# Patient Record
Sex: FEMALE | Race: WHITE | NOT HISPANIC OR LATINO
[De-identification: names, ages, dates, MRNs, and addresses within clinical notes are randomized per-mention and may not be internally consistent; named-entity substitution may affect disease eponyms.]

---

## 2018-06-26 ENCOUNTER — APPOINTMENT (OUTPATIENT)
Dept: NEUROLOGY | Facility: CLINIC | Age: 48
End: 2018-06-26
Payer: COMMERCIAL

## 2018-06-26 VITALS
RESPIRATION RATE: 18 BRPM | HEIGHT: 62 IN | WEIGHT: 110 LBS | DIASTOLIC BLOOD PRESSURE: 70 MMHG | BODY MASS INDEX: 20.24 KG/M2 | HEART RATE: 84 BPM | TEMPERATURE: 98.4 F | OXYGEN SATURATION: 96 % | SYSTOLIC BLOOD PRESSURE: 110 MMHG

## 2018-06-26 DIAGNOSIS — Z80.41 FAMILY HISTORY OF MALIGNANT NEOPLASM OF OVARY: ICD-10-CM

## 2018-06-26 DIAGNOSIS — E03.2 HYPOTHYROIDISM DUE TO MEDICAMENTS AND OTHER EXOGENOUS SUBSTANCES: ICD-10-CM

## 2018-06-26 DIAGNOSIS — Z82.0 FAMILY HISTORY OF EPILEPSY AND OTHER DISEASES OF THE NERVOUS SYSTEM: ICD-10-CM

## 2018-06-26 DIAGNOSIS — Z82.49 FAMILY HISTORY OF ISCHEMIC HEART DISEASE AND OTHER DISEASES OF THE CIRCULATORY SYSTEM: ICD-10-CM

## 2018-06-26 DIAGNOSIS — Z83.49 FAMILY HISTORY OF OTHER ENDOCRINE, NUTRITIONAL AND METABOLIC DISEASES: ICD-10-CM

## 2018-06-26 DIAGNOSIS — G43.709 CHRONIC MIGRAINE W/OUT AURA, NOT INTRACTABLE, W/OUT STATUS MIGRAINOSUS: ICD-10-CM

## 2018-06-26 PROCEDURE — 99244 OFF/OP CNSLTJ NEW/EST MOD 40: CPT

## 2018-08-10 ENCOUNTER — APPOINTMENT (OUTPATIENT)
Dept: NEUROLOGY | Facility: CLINIC | Age: 48
End: 2018-08-10

## 2021-08-26 ENCOUNTER — APPOINTMENT (OUTPATIENT)
Dept: ENDOCRINOLOGY | Facility: CLINIC | Age: 51
End: 2021-08-26
Payer: COMMERCIAL

## 2021-08-26 VITALS
HEIGHT: 62 IN | OXYGEN SATURATION: 99 % | DIASTOLIC BLOOD PRESSURE: 81 MMHG | WEIGHT: 135 LBS | SYSTOLIC BLOOD PRESSURE: 115 MMHG | HEART RATE: 80 BPM | BODY MASS INDEX: 24.84 KG/M2

## 2021-08-26 PROCEDURE — 99205 OFFICE O/P NEW HI 60 MIN: CPT

## 2021-08-28 NOTE — HISTORY OF PRESENT ILLNESS
[FreeTextEntry1] : Ms. MAKAYLA ROSS is a 51 year old female  with a past medical history of hypothyroidism who presents for management of her thyroid disease. Patient was first diagnosed with thyroid disorder 24 years ago. It started after her first pregnancy. Patient was on Levothyroxine 88 mcg QD for 17 years. Since May of last year, she started having edema.  She  takes levothyroxine/methimazole   mg  QD. Patient takes it in an empty stomach 30-60 mins before breakfast in the morning. \par Patient was started on Bioidentical hormones on November 2020. Patient kept gaining weight and she stopped the bioidentical hormones in 2/2021. She went to another endo. She was switched to Unithroid 100 mcg QD (May/2021). She did not feel better. She was switched to Unithroid 75 mcg + Cytomel 5 mcg. She was increased to Unithroid 100 mcg. She was also started on Bijuva. Patient had a breakthrough period in March. Before October, she had almost every month. Her period had stopped at one point on July 2019 when she had a major loss in her life.  \par \par \par

## 2021-08-28 NOTE — PHYSICAL EXAM
[Alert] : alert [Well Nourished] : well nourished [No Acute Distress] : no acute distress [Normal Sclera/Conjunctiva] : normal sclera/conjunctiva [Well Developed] : well developed [EOMI] : extra ocular movement intact [No Proptosis] : no proptosis [Normal Oropharynx] : the oropharynx was normal [No Thyroid Nodules] : no palpable thyroid nodules [Thyroid Not Enlarged] : the thyroid was not enlarged [No Respiratory Distress] : no respiratory distress [No Accessory Muscle Use] : no accessory muscle use [Clear to Auscultation] : lungs were clear to auscultation bilaterally [Normal S1, S2] : normal S1 and S2 [Normal Rate] : heart rate was normal [Regular Rhythm] : with a regular rhythm [No Edema] : no peripheral edema [Pedal Pulses Normal] : the pedal pulses are present [Not Tender] : non-tender [Normal Bowel Sounds] : normal bowel sounds [Not Distended] : not distended [Soft] : abdomen soft [Normal Anterior Cervical Nodes] : no anterior cervical lymphadenopathy [Normal Posterior Cervical Nodes] : no posterior cervical lymphadenopathy [No Spinal Tenderness] : no spinal tenderness [Spine Straight] : spine straight [No Stigmata of Cushings Syndrome] : no stigmata of Cushings Syndrome [Normal Gait] : normal gait [Normal Strength/Tone] : muscle strength and tone were normal [No Rash] : no rash [Acanthosis Nigricans] : no acanthosis nigricans [Normal Reflexes] : deep tendon reflexes were 2+ and symmetric [No Tremors] : no tremors [Oriented x3] : oriented to person, place, and time

## 2021-08-28 NOTE — ASSESSMENT
[FreeTextEntry1] : 51 year old female  with a past medical history of hypothyroidism who presents for management of her thyroid disease\par \par 1. Weight gain\par Suspect patient is experiencing all her symptoms from menopause\par Her labs are consistent with menopause\par It is likely the reason why her change in dose of thyroid replacement has not improved her symptoms\par Recommend to cont Bijuva for bone protection but explained it will not resolve all her symptoms or help with weight loss\par Patient is overwhelmed and upset by her weight gain. She is tearful. She does not feel well but unfortunately she is likey going through natural menopausal changes\par Discussed a course of phentermine to help reduce her BMI back to previous level\par Patient is in agreement\par \par 2. Hypothyroidism\par Her TSH is too low\par Stop Cytomel\par Cont Unithroid 100 mcg QD\par \par Follow up in 2 months

## 2021-09-23 ENCOUNTER — NON-APPOINTMENT (OUTPATIENT)
Age: 51
End: 2021-09-23

## 2021-11-01 ENCOUNTER — APPOINTMENT (OUTPATIENT)
Dept: ENDOCRINOLOGY | Facility: CLINIC | Age: 51
End: 2021-11-01
Payer: COMMERCIAL

## 2021-11-01 VITALS — OXYGEN SATURATION: 99 % | DIASTOLIC BLOOD PRESSURE: 80 MMHG | SYSTOLIC BLOOD PRESSURE: 134 MMHG | HEART RATE: 79 BPM

## 2021-11-01 PROCEDURE — 99214 OFFICE O/P EST MOD 30 MIN: CPT

## 2021-11-01 NOTE — PHYSICAL EXAM
[Alert] : alert [Well Nourished] : well nourished [Healthy Appearance] : healthy appearance [No Acute Distress] : no acute distress [Well Developed] : well developed [Normal Voice/Communication] : normal voice communication [Normal Sclera/Conjunctiva] : normal sclera/conjunctiva

## 2021-11-02 NOTE — HISTORY OF PRESENT ILLNESS
[FreeTextEntry1] : Ms. MAKAYLA ROSS is a 51 year old female  with a past medical history of hypothyroidism who presents for management of her thyroid disease. Patient feels a bit better. She lost 5 lbs.She is following with menopause specialist.\par \par Initial Hx: Patient was first diagnosed with thyroid disorder 24 years ago. It started after her first pregnancy. Patient was on Levothyroxine 88 mcg QD for 17 years. Since May of last year, she started having edema.  She  takes levothyroxine/methimazole   mg  QD. Patient takes it in an empty stomach 30-60 mins before breakfast in the morning. \par Patient was started on Bioidentical hormones on November 2020. Patient kept gaining weight and she stopped the bioidentical hormones in 2/2021. She went to another endo. She was switched to Unithroid 100 mcg QD (May/2021). She did not feel better. She was switched to Unithroid 75 mcg + Cytomel 5 mcg. She was increased to Unithroid 100 mcg. She was also started on Bijuva. Patient had a breakthrough period in March. Before October, she had almost every month. Her period had stopped at one point on July 2019 when she had a major loss in her life.  \par \par \par

## 2021-12-08 ENCOUNTER — NON-APPOINTMENT (OUTPATIENT)
Age: 51
End: 2021-12-08

## 2022-02-01 ENCOUNTER — APPOINTMENT (OUTPATIENT)
Dept: ENDOCRINOLOGY | Facility: CLINIC | Age: 52
End: 2022-02-01
Payer: COMMERCIAL

## 2022-02-01 VITALS
WEIGHT: 135 LBS | HEIGHT: 62 IN | SYSTOLIC BLOOD PRESSURE: 125 MMHG | OXYGEN SATURATION: 99 % | DIASTOLIC BLOOD PRESSURE: 85 MMHG | BODY MASS INDEX: 24.84 KG/M2 | HEART RATE: 80 BPM

## 2022-02-01 PROCEDURE — 99214 OFFICE O/P EST MOD 30 MIN: CPT

## 2022-02-01 RX ORDER — PHENTERMINE HYDROCHLORIDE 30 MG/1
30 CAPSULE ORAL
Qty: 30 | Refills: 2 | Status: DISCONTINUED | COMMUNITY
Start: 2021-08-26 | End: 2022-02-01

## 2022-02-02 NOTE — ASSESSMENT
[FreeTextEntry1] : 51 year old female  with a past medical history of hypothyroidism who presents for management of her thyroid disease\par \par 1. Weight gain\par Suspect patient is experiencing all her symptoms from menopause\par Her labs are consistent with menopause\par Patient stopped Phentermine\par She has been able to lose 8 lbs \par She is asking for another agent to helo with weight loss\par I suggested Bashir \par I also encouraged that overall her weight is healthy and she follows a very healthy lifestyle\par \par 2. Hypothyroidism\par TFTs are appropriate range\par Cont Levothyroxine to 100 mcg 6.5 tabs per week\par \par Follow up in 6 months

## 2022-02-02 NOTE — HISTORY OF PRESENT ILLNESS
[FreeTextEntry1] : Ms. MAKAYLA ROSS is a 51 year old female  with a past medical history of hypothyroidism who presents for management of her thyroid disease. Patient feels okay. She is still very upset about her weight. She was prescribed HRT by her menopause specialist. She had recent labs done.\par \par Initial Hx: Patient was first diagnosed with thyroid disorder 24 years ago. It started after her first pregnancy. Patient was on Levothyroxine 88 mcg QD for 17 years. Since May of last year, she started having edema.  She  takes levothyroxine/methimazole   mg  QD. Patient takes it in an empty stomach 30-60 mins before breakfast in the morning. \par Patient was started on Bioidentical hormones on November 2020. Patient kept gaining weight and she stopped the bioidentical hormones in 2/2021. She went to another endo. She was switched to Unithroid 100 mcg QD (May/2021). She did not feel better. She was switched to Unithroid 75 mcg + Cytomel 5 mcg. She was increased to Unithroid 100 mcg. She was also started on Bijuva. Patient had a breakthrough period in March. Before October, she had almost every month. Her period had stopped at one point on July 2019 when she had a major loss in her life.  \par \par \par

## 2022-02-02 NOTE — PHYSICAL EXAM
[Alert] : alert [Well Nourished] : well nourished [Healthy Appearance] : healthy appearance [No Acute Distress] : no acute distress [Well Developed] : well developed [Normal Voice/Communication] : normal voice communication [No Rash] : no rash [Oriented x3] : oriented to person, place, and time

## 2022-06-28 ENCOUNTER — RX RENEWAL (OUTPATIENT)
Age: 52
End: 2022-06-28

## 2022-08-23 ENCOUNTER — APPOINTMENT (OUTPATIENT)
Dept: ENDOCRINOLOGY | Facility: CLINIC | Age: 52
End: 2022-08-23

## 2022-08-23 ENCOUNTER — TRANSCRIPTION ENCOUNTER (OUTPATIENT)
Age: 52
End: 2022-08-23

## 2022-08-23 VITALS
DIASTOLIC BLOOD PRESSURE: 88 MMHG | OXYGEN SATURATION: 97 % | WEIGHT: 129 LBS | HEART RATE: 71 BPM | HEIGHT: 62 IN | SYSTOLIC BLOOD PRESSURE: 130 MMHG | BODY MASS INDEX: 23.74 KG/M2

## 2022-08-23 DIAGNOSIS — R63.5 ABNORMAL WEIGHT GAIN: ICD-10-CM

## 2022-08-23 PROCEDURE — 99214 OFFICE O/P EST MOD 30 MIN: CPT

## 2022-08-23 NOTE — HISTORY OF PRESENT ILLNESS
[FreeTextEntry1] : Ms. MAKAYLA ROSS is a 52 year old female  with a past medical history of hypothyroidism who presents for management of her thyroid disease. Patient feels okay. She is still very upset about her weight. She is on HRT by menopausal specialist. \par \par Initial Hx: Patient was first diagnosed with thyroid disorder 24 years ago. It started after her first pregnancy. Patient was on Levothyroxine 88 mcg QD for 17 years. Since May of last year, she started having edema.  She  takes levothyroxine/methimazole   mg  QD. Patient takes it in an empty stomach 30-60 mins before breakfast in the morning. \par Patient was started on Bioidentical hormones on November 2020. Patient kept gaining weight and she stopped the bioidentical hormones in 2/2021. She went to another endo. She was switched to Unithroid 100 mcg QD (May/2021). She did not feel better. She was switched to Unithroid 75 mcg + Cytomel 5 mcg. She was increased to Unithroid 100 mcg. She was also started on Bijuva. Patient had a breakthrough period in March. Before October, she had almost every month. Her period had stopped at one point on July 2019 when she had a major loss in her life.  \par \par \par

## 2022-08-23 NOTE — ASSESSMENT
[FreeTextEntry1] : 52 year old female  with a past medical history of hypothyroidism who presents for management of her thyroid disease\par \par 1. Weight gain\par Suspect patient is experiencing all her symptoms from menopause\par Her labs are consistent with menopause\par HRT also likely contributed but it has more benefits for her\par Will try Buproprion to help with weight loss\par I also encouraged that overall her weight is healthy and she follows a very healthy lifestyle\par \par 2. Hypothyroidism\par TFTs are appropriate range\par Cont Levothyroxine to 100 mcg 6.5 tabs per week\par \par Follow up in 6 months

## 2022-09-30 RX ORDER — ORLISTAT 60 MG/1
60 CAPSULE ORAL
Qty: 90 | Refills: 1 | Status: DISCONTINUED | COMMUNITY
Start: 2022-02-01 | End: 2022-09-30

## 2022-10-20 ENCOUNTER — RX RENEWAL (OUTPATIENT)
Age: 52
End: 2022-10-20

## 2023-01-06 ENCOUNTER — RX RENEWAL (OUTPATIENT)
Age: 53
End: 2023-01-06

## 2023-02-01 ENCOUNTER — RX RENEWAL (OUTPATIENT)
Age: 53
End: 2023-02-01

## 2023-03-01 ENCOUNTER — APPOINTMENT (OUTPATIENT)
Dept: ENDOCRINOLOGY | Facility: CLINIC | Age: 53
End: 2023-03-01

## 2023-03-02 ENCOUNTER — APPOINTMENT (OUTPATIENT)
Dept: GYNECOLOGIC ONCOLOGY | Facility: CLINIC | Age: 53
End: 2023-03-02
Payer: COMMERCIAL

## 2023-03-02 VITALS
OXYGEN SATURATION: 100 % | WEIGHT: 125 LBS | SYSTOLIC BLOOD PRESSURE: 122 MMHG | BODY MASS INDEX: 23 KG/M2 | DIASTOLIC BLOOD PRESSURE: 82 MMHG | HEIGHT: 62 IN | HEART RATE: 69 BPM

## 2023-03-02 DIAGNOSIS — Z01.818 ENCOUNTER FOR OTHER PREPROCEDURAL EXAMINATION: ICD-10-CM

## 2023-03-02 DIAGNOSIS — Z87.891 PERSONAL HISTORY OF NICOTINE DEPENDENCE: ICD-10-CM

## 2023-03-02 PROCEDURE — 99204 OFFICE O/P NEW MOD 45 MIN: CPT

## 2023-03-02 RX ORDER — TOPIRAMATE 200 MG/1
200 CAPSULE, EXTENDED RELEASE ORAL
Refills: 0 | Status: DISCONTINUED | COMMUNITY
End: 2023-03-02

## 2023-03-02 RX ORDER — TOPIRAMATE 50 MG/1
TABLET, COATED ORAL
Refills: 0 | Status: ACTIVE | COMMUNITY

## 2023-03-02 RX ORDER — PROPRANOLOL HYDROCHLORIDE 80 MG/1
80 CAPSULE, EXTENDED RELEASE ORAL
Qty: 60 | Refills: 0 | Status: DISCONTINUED | COMMUNITY
Start: 2018-06-26 | End: 2023-03-02

## 2023-03-02 RX ORDER — LEVOTHYROXINE SODIUM 0.09 MG/1
88 TABLET ORAL
Refills: 0 | Status: DISCONTINUED | COMMUNITY
End: 2023-03-02

## 2023-03-06 ENCOUNTER — FORM ENCOUNTER (OUTPATIENT)
Age: 53
End: 2023-03-06

## 2023-03-06 ENCOUNTER — RESULT REVIEW (OUTPATIENT)
Age: 53
End: 2023-03-06

## 2023-03-06 NOTE — HISTORY OF PRESENT ILLNESS
[FreeTextEntry1] : 53 yo  via  x 3 LMP 3/2021 referred by Dr. Manjit Major, presents to office to discuss newly diagnosed endometrial cancer. Patient reports since 10/2020 she has been experiencing multiple abnormal symptoms including bilateral leg swelling and unexplained weight gain which she was initially related to her thyroid (hx of Hashimoto's) for which she presented to her GYN, and was told she had no circulating hormones by PA. She then reports over the time frame of 1.5 years she was on oral estrogen, progesterone, cortisol, T3 as well as bioidentical hormones in pill and cream form. She reports to no change in her symptoms at this time and presented to Dr. Major in 3/2022 and was prescribed vaginal progesterone and had menstrual cycle x 1. She then followed with Dr. Radha Gutierrez who specializes in menopausal symptoms and was prescribed estradiol patch x 4 months then began to take oral bioidentical progesterone in 2022. She reports episode of heavy vaginal bleeding in 2022 lasting 7 days with associated pelvic cramping and ovarian discomfort which prompted workup. Denies abdominal pain/bloating/distension, pelvis discomfort, changes in normal bowel/urinary habits, nausea/vomiting, unintentional weight loss/gain, and all other associated signs and symptoms. \par \par Patient underwent EMB on 23 with pathology revealing small fragments of adenocarcinoma, endometrioid type, well differentiated, FIGO 1/3.\par \par Of note patient reports to having genetic testing done > 10 years ago which was negative. \par \par Last pap: ; normal as per patient \par Last mammo: ; normal as per patient \par Last colo: Patient has not yet had\par \par Slide release -- Jim labs [id number]?

## 2023-03-06 NOTE — END OF VISIT
[FreeTextEntry3] : This note accurately reflects the work and decisions made by me.\par Written by Annita Lazaro PA-C acting as a scribe for Dr. Caty Mark.

## 2023-03-06 NOTE — PHYSICAL EXAM
[Chaperone Present] : A chaperone was present in the examining room during all aspects of the physical examination [Normal] : Bimanual Exam: Normal [FreeTextEntry1] : Annita Lazaro PA-C [de-identified] : retroverted  [de-identified] : Patient refused rectal examination at this time.

## 2023-03-06 NOTE — CHIEF COMPLAINT
[FreeTextEntry1] : [facility]\par [address]\[place] [address]\par [phone]\par \par Endometrial cancer

## 2023-03-06 NOTE — ASSESSMENT
[FreeTextEntry1] : 53 yo female with newly diagnosed endometrial cancer. I discussed with the patient the recommendation due to prolonged symptoms and post menopausal bleeding/spotting I would recommend CT C/A/P for further evaluation. We will also request genetic testing and records from Dr. Major for review. \par \par I discussed at length with the patient the nature, purpose, risks, benefits, and alternatives of pelvic examination under anesthesia, robotic assisted total laparoscopic hysterectomy with bilateral salpingo-oophorectomy, sentinel lymph node mapping with node biopsies, possible cystoscopy, possible laparotomy.  The patient understands the risks to include (but not be limited to) bowel injury, bleeding (with the possible need for transfusion), bladder or ureteral injury, infections, deep venous thrombosis, and beatriz-operative death.  The patient also understands that her surgery may not be able to be performed laparoscopically and that she may need a laparotomy (either vertical midline or pfannensteil).  She also understands the limitations of laparoscopic surgery and the possibility of missing a surgical complication with need for subsequent re-exploration.  She agrees to proceed.  She asked numerous questions which were answered to her satisfaction. She also understands the rationale for a possible cystoscopy at the completion of the procedure and the potential risks of cystoscopy.\par \par All questions and concerns were answered to patient's apparent satisfaction.\par

## 2023-03-08 ENCOUNTER — OUTPATIENT (OUTPATIENT)
Dept: OUTPATIENT SERVICES | Facility: HOSPITAL | Age: 53
LOS: 1 days | End: 2023-03-08
Payer: COMMERCIAL

## 2023-03-08 ENCOUNTER — APPOINTMENT (OUTPATIENT)
Dept: CT IMAGING | Facility: CLINIC | Age: 53
End: 2023-03-08
Payer: COMMERCIAL

## 2023-03-08 DIAGNOSIS — Z41.9 ENCOUNTER FOR PROCEDURE FOR PURPOSES OTHER THAN REMEDYING HEALTH STATE, UNSPECIFIED: Chronic | ICD-10-CM

## 2023-03-08 DIAGNOSIS — C54.1 MALIGNANT NEOPLASM OF ENDOMETRIUM: ICD-10-CM

## 2023-03-08 DIAGNOSIS — Z98.89 OTHER SPECIFIED POSTPROCEDURAL STATES: Chronic | ICD-10-CM

## 2023-03-08 PROCEDURE — 74177 CT ABD & PELVIS W/CONTRAST: CPT | Mod: 26

## 2023-03-08 PROCEDURE — 71260 CT THORAX DX C+: CPT

## 2023-03-08 PROCEDURE — 71260 CT THORAX DX C+: CPT | Mod: 26

## 2023-03-08 PROCEDURE — 74177 CT ABD & PELVIS W/CONTRAST: CPT

## 2023-03-10 ENCOUNTER — TRANSCRIPTION ENCOUNTER (OUTPATIENT)
Age: 53
End: 2023-03-10

## 2023-03-13 ENCOUNTER — TRANSCRIPTION ENCOUNTER (OUTPATIENT)
Age: 53
End: 2023-03-13

## 2023-03-13 ENCOUNTER — RESULT REVIEW (OUTPATIENT)
Age: 53
End: 2023-03-13

## 2023-03-13 ENCOUNTER — OUTPATIENT (OUTPATIENT)
Dept: OUTPATIENT SERVICES | Facility: HOSPITAL | Age: 53
LOS: 1 days | End: 2023-03-13
Payer: MEDICARE

## 2023-03-13 DIAGNOSIS — N85: ICD-10-CM

## 2023-03-13 DIAGNOSIS — Z98.89 OTHER SPECIFIED POSTPROCEDURAL STATES: Chronic | ICD-10-CM

## 2023-03-13 DIAGNOSIS — Z41.9 ENCOUNTER FOR PROCEDURE FOR PURPOSES OTHER THAN REMEDYING HEALTH STATE, UNSPECIFIED: Chronic | ICD-10-CM

## 2023-03-13 LAB — SURGICAL PATHOLOGY STUDY: SIGNIFICANT CHANGE UP

## 2023-03-13 PROCEDURE — 88321 CONSLTJ&REPRT SLD PREP ELSWR: CPT

## 2023-03-14 ENCOUNTER — TRANSCRIPTION ENCOUNTER (OUTPATIENT)
Age: 53
End: 2023-03-14

## 2023-03-14 ENCOUNTER — NON-APPOINTMENT (OUTPATIENT)
Age: 53
End: 2023-03-14

## 2023-03-15 ENCOUNTER — TRANSCRIPTION ENCOUNTER (OUTPATIENT)
Age: 53
End: 2023-03-15

## 2023-03-16 ENCOUNTER — FORM ENCOUNTER (OUTPATIENT)
Age: 53
End: 2023-03-16

## 2023-03-20 ENCOUNTER — NON-APPOINTMENT (OUTPATIENT)
Age: 53
End: 2023-03-20

## 2023-03-20 ENCOUNTER — TRANSCRIPTION ENCOUNTER (OUTPATIENT)
Age: 53
End: 2023-03-20

## 2023-03-20 ENCOUNTER — OUTPATIENT (OUTPATIENT)
Dept: OUTPATIENT SERVICES | Facility: HOSPITAL | Age: 53
LOS: 1 days | End: 2023-03-20
Payer: MEDICARE

## 2023-03-20 VITALS
HEIGHT: 62 IN | WEIGHT: 121.25 LBS | HEART RATE: 82 BPM | OXYGEN SATURATION: 98 % | DIASTOLIC BLOOD PRESSURE: 70 MMHG | TEMPERATURE: 98 F | RESPIRATION RATE: 12 BRPM | SYSTOLIC BLOOD PRESSURE: 118 MMHG

## 2023-03-20 DIAGNOSIS — Z98.890 OTHER SPECIFIED POSTPROCEDURAL STATES: Chronic | ICD-10-CM

## 2023-03-20 DIAGNOSIS — C54.1 MALIGNANT NEOPLASM OF ENDOMETRIUM: ICD-10-CM

## 2023-03-20 DIAGNOSIS — E03.9 HYPOTHYROIDISM, UNSPECIFIED: ICD-10-CM

## 2023-03-20 DIAGNOSIS — Z41.9 ENCOUNTER FOR PROCEDURE FOR PURPOSES OTHER THAN REMEDYING HEALTH STATE, UNSPECIFIED: Chronic | ICD-10-CM

## 2023-03-20 DIAGNOSIS — Z13.89 ENCOUNTER FOR SCREENING FOR OTHER DISORDER: ICD-10-CM

## 2023-03-20 DIAGNOSIS — Z98.89 OTHER SPECIFIED POSTPROCEDURAL STATES: Chronic | ICD-10-CM

## 2023-03-20 DIAGNOSIS — Z29.9 ENCOUNTER FOR PROPHYLACTIC MEASURES, UNSPECIFIED: ICD-10-CM

## 2023-03-20 DIAGNOSIS — Z01.818 ENCOUNTER FOR OTHER PREPROCEDURAL EXAMINATION: ICD-10-CM

## 2023-03-20 LAB
A1C WITH ESTIMATED AVERAGE GLUCOSE RESULT: 5.5 % — SIGNIFICANT CHANGE UP (ref 4–5.6)
ANION GAP SERPL CALC-SCNC: 11 MMOL/L — SIGNIFICANT CHANGE UP (ref 5–17)
APTT BLD: 29.2 SEC — SIGNIFICANT CHANGE UP (ref 27.5–35.5)
BASOPHILS # BLD AUTO: 0.04 K/UL — SIGNIFICANT CHANGE UP (ref 0–0.2)
BASOPHILS NFR BLD AUTO: 0.7 % — SIGNIFICANT CHANGE UP (ref 0–2)
BLD GP AB SCN SERPL QL: SIGNIFICANT CHANGE UP
BUN SERPL-MCNC: 11.2 MG/DL — SIGNIFICANT CHANGE UP (ref 8–20)
CALCIUM SERPL-MCNC: 9.3 MG/DL — SIGNIFICANT CHANGE UP (ref 8.4–10.5)
CHLORIDE SERPL-SCNC: 110 MMOL/L — HIGH (ref 96–108)
CO2 SERPL-SCNC: 23 MMOL/L — SIGNIFICANT CHANGE UP (ref 22–29)
CREAT SERPL-MCNC: 0.95 MG/DL — SIGNIFICANT CHANGE UP (ref 0.5–1.3)
EGFR: 72 ML/MIN/1.73M2 — SIGNIFICANT CHANGE UP
EOSINOPHIL # BLD AUTO: 0.14 K/UL — SIGNIFICANT CHANGE UP (ref 0–0.5)
EOSINOPHIL NFR BLD AUTO: 2.4 % — SIGNIFICANT CHANGE UP (ref 0–6)
ESTIMATED AVERAGE GLUCOSE: 111 MG/DL — SIGNIFICANT CHANGE UP (ref 68–114)
GLUCOSE SERPL-MCNC: 102 MG/DL — HIGH (ref 70–99)
HCT VFR BLD CALC: 41.9 % — SIGNIFICANT CHANGE UP (ref 34.5–45)
HGB BLD-MCNC: 13.8 G/DL — SIGNIFICANT CHANGE UP (ref 11.5–15.5)
IMM GRANULOCYTES NFR BLD AUTO: 0.2 % — SIGNIFICANT CHANGE UP (ref 0–0.9)
INR BLD: 1.09 RATIO — SIGNIFICANT CHANGE UP (ref 0.88–1.16)
LYMPHOCYTES # BLD AUTO: 2 K/UL — SIGNIFICANT CHANGE UP (ref 1–3.3)
LYMPHOCYTES # BLD AUTO: 33.8 % — SIGNIFICANT CHANGE UP (ref 13–44)
MAGNESIUM SERPL-MCNC: 1.9 MG/DL — SIGNIFICANT CHANGE UP (ref 1.6–2.6)
MCHC RBC-ENTMCNC: 29.7 PG — SIGNIFICANT CHANGE UP (ref 27–34)
MCHC RBC-ENTMCNC: 32.9 GM/DL — SIGNIFICANT CHANGE UP (ref 32–36)
MCV RBC AUTO: 90.1 FL — SIGNIFICANT CHANGE UP (ref 80–100)
MONOCYTES # BLD AUTO: 0.49 K/UL — SIGNIFICANT CHANGE UP (ref 0–0.9)
MONOCYTES NFR BLD AUTO: 8.3 % — SIGNIFICANT CHANGE UP (ref 2–14)
NEUTROPHILS # BLD AUTO: 3.23 K/UL — SIGNIFICANT CHANGE UP (ref 1.8–7.4)
NEUTROPHILS NFR BLD AUTO: 54.6 % — SIGNIFICANT CHANGE UP (ref 43–77)
PHOSPHATE SERPL-MCNC: 2.9 MG/DL — SIGNIFICANT CHANGE UP (ref 2.4–4.7)
PLATELET # BLD AUTO: 324 K/UL — SIGNIFICANT CHANGE UP (ref 150–400)
POTASSIUM SERPL-MCNC: 4 MMOL/L — SIGNIFICANT CHANGE UP (ref 3.5–5.3)
POTASSIUM SERPL-SCNC: 4 MMOL/L — SIGNIFICANT CHANGE UP (ref 3.5–5.3)
PROTHROM AB SERPL-ACNC: 12.7 SEC — SIGNIFICANT CHANGE UP (ref 10.5–13.4)
RBC # BLD: 4.65 M/UL — SIGNIFICANT CHANGE UP (ref 3.8–5.2)
RBC # FLD: 12.7 % — SIGNIFICANT CHANGE UP (ref 10.3–14.5)
SODIUM SERPL-SCNC: 144 MMOL/L — SIGNIFICANT CHANGE UP (ref 135–145)
T3 SERPL-MCNC: 93 NG/DL — SIGNIFICANT CHANGE UP (ref 80–200)
T4 AB SER-ACNC: 9 UG/DL — SIGNIFICANT CHANGE UP (ref 4.5–12)
TSH SERPL-MCNC: 0.22 UIU/ML — LOW (ref 0.27–4.2)
WBC # BLD: 5.91 K/UL — SIGNIFICANT CHANGE UP (ref 3.8–10.5)
WBC # FLD AUTO: 5.91 K/UL — SIGNIFICANT CHANGE UP (ref 3.8–10.5)

## 2023-03-20 PROCEDURE — 93005 ELECTROCARDIOGRAM TRACING: CPT

## 2023-03-20 PROCEDURE — 93010 ELECTROCARDIOGRAM REPORT: CPT

## 2023-03-20 PROCEDURE — G0463: CPT

## 2023-03-20 NOTE — H&P PST ADULT - PRO TOBACCO TYPE
quit a while ago, but would have a cigarette every once in a blue moon when very stressed, has not had any cigarettes since 9/2022

## 2023-03-20 NOTE — H&P PST ADULT - NSICDXPASTMEDICALHX_GEN_ALL_CORE_FT
PAST MEDICAL HISTORY:  Herniated lumbar intervertebral disc L5 - following work accident    Hypothyroid     Inguinal hernia with obstruction unilateral     Migraine

## 2023-03-20 NOTE — H&P PST ADULT - HISTORY OF PRESENT ILLNESS
53 year old female ,  via  x 3 , LMP 3/202, PMH includes migraines, hypothyroidism and newly diagnosed endometrial cancer. Patient reports since 10/2020 she has been experiencing multiple abnormal symptoms including bilateral leg swelling and unexplained weight gain which she was initially related to her thyroid (hx of Hashimoto's) for which she presented to her GYN, and was told she had no circulating hormones by PA. She then reports over the time frame of 1.5 years she was on oral estrogen, progesterone, cortisol, T3 as well as bioidentical hormones in pill and cream form. She reports to no change in her symptoms at this time and presented to Dr. Major in 3/2022 and was prescribed vaginal progesterone and had menstrual cycle x 1. She then followed with Dr. Radha Gutierrez who specializes in menopausal symptoms and was prescribed estradiol patch x 4 months then began to take oral bioidentical progesterone in 2022. She reports episode of heavy vaginal bleeding in 2022 lasting 7 days with associated pelvic cramping and ovarian discomfort which prompted workup. Denies abdominal pain/bloating/distension, pelvis discomfort, changes in normal bowel/urinary habits, nausea/vomiting, unintentional weight loss/gain, and all other associated signs and symptoms.     Patient underwent EMB on 23 with pathology revealing small fragments of adenocarcinoma, endometrioid type, well differentiated, FIGO 1/3.    Of note patient reports to having genetic testing done > 10 years ago which was negative.     Last pap: ; normal as per patient   Last mammo: ; normal as per patient   Last colo: Patient has not yet had    Slide release -- Jim labs [id number]?     Patient Care Team      Active Problems  Acquired hypothyroidism (244.9) (E03.9)  Chronic migraine  Endometrial cancer (182.0) (C54.1)  Hashimoto's disease (245.2) (E06.3)  Weight gain (783.1) (R63.5)    Past Medical History  History of Iatrogenic hypothyroidism (244.3) (E03.2)    Menstrual Hx: The patient is postmenopausal.      53 year old female ,  via  x 3 , LMP 3/2021, PMH includes migraines, hypothyroidism and newly diagnosed endometrial cancer.   Patient reports in May/2020 she started experiencing bilateral leg swelling and unexplained weight gain which she initially contributed to her  thyroid (hx of Hashimoto's). She presented to her GYN, and was told she had no circulating hormones. She then reports over the time frame of 1.5 years she was on oral estrogen, progesterone, cortisol, T3 as well as bioidentical  hormones in pill and cream form. She reports to no change in her symptoms at this time and presented to Dr. Major in 3/2021 and was prescribed vaginal progesterone and had menstrual cycle x 1, she stopped vaginal progesterone in 2021, with no further episodes of vaginal bleeding.  She then followed with Dr. Radha Gutierrez who specializes in menopausal symptoms and was prescribed estradiol patch x 4 months 2022  it was increased in 2022. She then began to take oral bioidentical progesterone in 2022. Patient started "tweaking" her medication herself. She decreased her estrogen 0.025mg in 2020, increased her progesterone 200mg x 12 days, increased her levothyroxine 1x a week  200mcg and 100mcg x 6 days and she started to feel better. She repeated this regimen in Dec 2022, but on 2022 she had episode of heavy vaginal bleeding  lasting 7 days with associated pelvic cramping and ovarian discomfort which prompted workup. Reports continued pelvic cramping, lower back pain. No further episodes of bleeding since Dec 2022. US performed Dec 2022, which demonstrated endometrial thickening. Patient underwent EMB on 23 by Dr. Major with pathology revealing small fragments of adenocarcinoma, endometrioid type, well differentiated, FIGO 1/3. Denies abdominal pain/bloating/distension,  changes in normal bowel/urinary habits, nausea/vomiting, unintentional weight loss/gain, and all other associated signs and symptoms. Mother with hx of ovarian cancer dx in 60's  Of note patient reports to having genetic testing done > 10 years ago which was negative.            53 year old female ,  via  x 3 , LMP 3/2021, PMH includes migraines, hypothyroidism and newly diagnosed endometrial cancer. She presents today for PST for upcoming surgical procedure. Patient reports in May 2020 she started experiencing bilateral leg swelling and unexplained weight gain which she initially contributed to her  thyroid (hx of Hashimoto's). She presented to her GYN, blood work performed and was told she had no circulating hormones. She then reports over the time frame of 1.5 years she was on oral estrogen, progesterone, cortisol, T3 as well as bioidentical  hormones in pill and cream form. She reports no change in her symptoms, and she presented to Dr. Major in 3/2021 and was prescribed vaginal progesterone and had menstrual cycle x 1, she stopped vaginal progesterone in 2021, with no further episodes of vaginal bleeding.  She then followed with Dr. Radha Gutierrez who specializes in menopausal symptoms and was prescribed estradiol patch x 4 months (starting 2022)  it was increased in 2022. She then began to take oral bioidentical progesterone in 2022. Patient started "tweaking" her medications herself. She decreased her estrogen 0.025mg in 2020, increased her progesterone 200mg x 12 days, increased her levothyroxine she took 200mcg on Sundays and 100mcg daily the rest of the week, stats that with this regimen she started to feel better. She repeated this regimen in Dec 2022, but on 2022 she had episode of heavy vaginal bleeding  lasting 7 days with associated pelvic cramping and ovarian discomfort which prompted workup. US performed Dec 2022, which demonstrated endometrial thickening. Patient underwent EMB on 23 by Dr. Major with pathology revealing small fragments of adenocarcinoma, endometrioid type, well differentiated, FIGO 1/3. Reports continued pelvic cramping, lower back pain.  Denies repeat episodes of bleeding. Denies abdominal pain/bloating/distension, changes in normal bowel/urinary habits, nausea/vomiting, unintentional weight loss/gain, and all other associated signs and symptoms. Reports mother with hx of ovarian cancer dx in her 60's. Reports genetic testing performed >10 years ago was negative. She is now scheduled for pelvic exam under anesthesia, robotic assisted total laparoscopic, hysterectomy, bilateral salpingo-oophorectomy, possible laparotomy, sentinel lymph node mapping with dissection on 3/27/2023 with Dr. Mark pending medical clearance.

## 2023-03-20 NOTE — H&P PST ADULT - NSICDXFAMILYHX_GEN_ALL_CORE_FT
FAMILY HISTORY:  Father  Still living? Yes, Estimated age: 61-70  Family history of coronary artery disease in father, Age at diagnosis: Age Unknown    Mother  Still living? No  Family history of ovarian cancer, Age at diagnosis: Age Unknown

## 2023-03-20 NOTE — H&P PST ADULT - ASSESSMENT
CAPRINI SCORE    AGE RELATED RISK FACTORS                                                             [ ] Age 41-60 years                                            (1 Point)  [ ] Age: 61-74 years                                           (2 Points)                 [ ] Age= 75 years                                                (3 Points)             DISEASE RELATED RISK FACTORS                                                       [ ] Edema in the lower extremities                 (1 Point)                     [ ] Varicose veins                                               (1 Point)                                 [ ] BMI > 25 Kg/m2                                            (1 Point)                                  [ ] Serious infection (ie PNA)                            (1 Point)                     [ ] Lung disease ( COPD, Emphysema)            (1 Point)                                                                          [ ] Acute myocardial infarction                         (1 Point)                  [ ] Congestive heart failure (in the previous month)  (1 Point)         [ ] Inflammatory bowel disease                            (1 Point)                  [ ] Central venous access, PICC or Port               (2 points)       (within the last month)                                                                [ ] Stroke (in the previous month)                        (5 Points)    [ ] Previous or present malignancy                       (2 points)                                                                                                                                                         HEMATOLOGY RELATED FACTORS                                                         [ ] Prior episodes of VTE                                     (3 Points)                     [ ] Positive family history for VTE                      (3 Points)                  [ ] Prothrombin 18022 A                                     (3 Points)                     [ ] Factor V Leiden                                                (3 Points)                        [ ] Lupus anticoagulants                                      (3 Points)                                                           [ ] Anticardiolipin antibodies                              (3 Points)                                                       [ ] High homocysteine in the blood                   (3 Points)                                             [ ] Other congenital or acquired thrombophilia      (3 Points)                                                [ ] Heparin induced thrombocytopenia                  (3 Points)                                        MOBILITY RELATED FACTORS  [ ] Bed rest                                                         (1 Point)  [ ] Plaster cast                                                    (2 points)  [ ] Bed bound for more than 72 hours           (2 Points)    GENDER SPECIFIC FACTORS  [ ] Pregnancy or had a baby within the last month   (1 Point)  [ ] Post-partum < 6 weeks                                   (1 Point)  [ ] Hormonal therapy  or oral contraception   (1 Point)  [ ] History of pregnancy complications              (1 point)  [ ] Unexplained or recurrent              (1 Point)    OTHER RISK FACTORS                                           (1 Point)  [ ] BMI >40, smoking, diabetes requiring insulin, chemotherapy  blood transfusions and length of surgery over 2 hours    SURGERY RELATED RISK FACTORS  [ ]  Section within the last month     (1 Point)  [ ] Minor surgery                                                  (1 Point)  [ ] Arthroscopic surgery                                       (2 Points)  [ ] Planned major surgery lasting more            (2 Points)      than 45 minutes     [ ] Elective hip or knee joint replacement       (5 points)       surgery                                                TRAUMA RELATED RISK FACTORS  [ ] Fracture of the hip, pelvis, or leg                       (5 Points)  [ ] Spinal cord injury resulting in paralysis             (5 points)       (in the previous month)    [ ] Paralysis  (less than 1 month)                             (5 Points)  [ ] Multiple Trauma within 1 month                        (5 Points)    Total Score [        ]    Caprini Score 0-2: Low Risk, NO VTE prophylaxis required for most patients, encourage ambulation  Caprini Score 3-6: Moderate Risk , pharmacologic VTE prophylaxis is indicated for most patients (in the absence of contraindications)  Caprini Score Greater than or =7: High risk, pharmocologic VTE prophylaxis indicated for most patients (in the absence of contraindications)      OPIOID RISK TOOL    KINJAL EACH BOX THAT APPLIES AND ADD TOTALS AT THE END    FAMILY HISTORY OF SUBSTANCE ABUSE                 FEMALE         MALE                                                Alcohol                             [  ]1 pt          [  ]3pts                                               Illegal Durgs                     [  ]2 pts        [  ]3pts                                               Rx Drugs                           [  ]4 pts        [  ]4 pts    PERSONAL HISTORY OF SUBSTANCE ABUSE                                                                                          Alcohol                             [  ]3 pts       [  ]3 pts                                               Illegal Drugs                     [  ]4 pts        [  ]4 pts                                               Rx Drugs                           [  ]5 pts        [  ]5 pts    AGE BETWEEN 16-45 YEARS                                      [  ]1 pt         [  ]1 pt    HISTORY OF PREADOLESCENT   SEXUAL ABUSE                                                             [  ]3 pts        [  ]0pts    PSYCHOLOGICAL DISEASE                     ADD, OCD, Bipolar, Schizophrenia        [  ]2 pts         [  ]2 pts                      Depression                                               [  ]1 pt           [  ]1 pt           SCORING TOTAL   (add numbers and type here)              (***)                                     A score of 3 or lower indicated LOW risk for future opioid abuse  A score of 4 to 7 indicated moderate risk for future opioid abuse  A score of 8 or higher indicates a high risk for opioid abuse                             This is a pleasant slightly anxious 53 year old female in NAD ,  via  x 3 , LMP 3/2021, PMH includes migraines, hypothyroidism and newly diagnosed endometrial cancer. She presents today for PST for upcoming surgical procedure. Patient with newly diagnosed endometrial cancer. Patient with 1 episode of PMB 2022. As per patient  US performed Dec 2022, which demonstrated endometrial thickening. As per Dr. Mark' note patient underwent EMB on 23 by Dr. Major with pathology revealing small fragments of adenocarcinoma, endometrioid type, well differentiated, FIGO 1/3. She has a family history of ovarian cancer- mother dx in her 60's. Reports genetic testing performed >10 years ago was negative. She is now scheduled for pelvic exam under anesthesia, robotic assisted total laparoscopic, hysterectomy, bilateral salpingo-oophorectomy, possible laparotomy, sentinel lymph node mapping with dissection on 3/27/2023 with Dr. Mark pending medical clearance.       CAPRINI SCORE    AGE RELATED RISK FACTORS                                                             [X ] Age 41-60 years                                            (1 Point)  [ ] Age: 61-74 years                                           (2 Points)                 [ ] Age= 75 years                                                (3 Points)             DISEASE RELATED RISK FACTORS                                                       [ ] Edema in the lower extremities                 (1 Point)                     [ ] Varicose veins                                               (1 Point)                                 [ ] BMI > 25 Kg/m2                                            (1 Point)                                  [ ] Serious infection (ie PNA)                            (1 Point)                     [ ] Lung disease ( COPD, Emphysema)            (1 Point)                                                                          [ ] Acute myocardial infarction                         (1 Point)                  [ ] Congestive heart failure (in the previous month)  (1 Point)         [ ] Inflammatory bowel disease                            (1 Point)                  [ ] Central venous access, PICC or Port               (2 points)       (within the last month)                                                                [ ] Stroke (in the previous month)                        (5 Points)    [ X] Previous or present malignancy                       (2 points)                                                                                                                                                         HEMATOLOGY RELATED FACTORS                                                         [ ] Prior episodes of VTE                                     (3 Points)                     [ ] Positive family history for VTE                      (3 Points)                  [ ] Prothrombin 66793 A                                     (3 Points)                     [ ] Factor V Leiden                                                (3 Points)                        [ ] Lupus anticoagulants                                      (3 Points)                                                           [ ] Anticardiolipin antibodies                              (3 Points)                                                       [ ] High homocysteine in the blood                   (3 Points)                                             [ ] Other congenital or acquired thrombophilia      (3 Points)                                                [ ] Heparin induced thrombocytopenia                  (3 Points)                                        MOBILITY RELATED FACTORS  [ ] Bed rest                                                         (1 Point)  [ ] Plaster cast                                                    (2 points)  [ ] Bed bound for more than 72 hours           (2 Points)    GENDER SPECIFIC FACTORS  [ ] Pregnancy or had a baby within the last month   (1 Point)  [ ] Post-partum < 6 weeks                                   (1 Point)  [X ] Hormonal therapy  or oral contraception   (1 Point)  [ ] History of pregnancy complications              (1 point)  [ ] Unexplained or recurrent              (1 Point)    OTHER RISK FACTORS                                           (1 Point)  [ X] BMI >40, smoking, diabetes requiring insulin, chemotherapy  blood transfusions and length of surgery over 2 hours    SURGERY RELATED RISK FACTORS  [ ]  Section within the last month     (1 Point)  [ ] Minor surgery                                                  (1 Point)  [ ] Arthroscopic surgery                                       (2 Points)  [X ] Planned major surgery lasting more            (2 Points)      than 45 minutes     [ ] Elective hip or knee joint replacement       (5 points)       surgery                                                TRAUMA RELATED RISK FACTORS  [ ] Fracture of the hip, pelvis, or leg                       (5 Points)  [ ] Spinal cord injury resulting in paralysis             (5 points)       (in the previous month)    [ ] Paralysis  (less than 1 month)                             (5 Points)  [ ] Multiple Trauma within 1 month                        (5 Points)    Total Score [     7   ]    Caprini Score 0-2: Low Risk, NO VTE prophylaxis required for most patients, encourage ambulation  Caprini Score 3-6: Moderate Risk , pharmacologic VTE prophylaxis is indicated for most patients (in the absence of contraindications)  Caprini Score Greater than or =7: High risk, pharmocologic VTE prophylaxis indicated for most patients (in the absence of contraindications)      OPIOID RISK TOOL    KINJAL EACH BOX THAT APPLIES AND ADD TOTALS AT THE END    FAMILY HISTORY OF SUBSTANCE ABUSE                 FEMALE         MALE                                                Alcohol                             [  ]1 pt          [  ]3pts                                               Illegal Durgs                     [  ]2 pts        [  ]3pts                                               Rx Drugs                           [  ]4 pts        [  ]4 pts    PERSONAL HISTORY OF SUBSTANCE ABUSE                                                                                          Alcohol                             [  ]3 pts       [  ]3 pts                                               Illegal Drugs                     [  ]4 pts        [  ]4 pts                                               Rx Drugs                           [  ]5 pts        [  ]5 pts    AGE BETWEEN 16-45 YEARS                                      [  ]1 pt         [  ]1 pt    HISTORY OF PREADOLESCENT   SEXUAL ABUSE                                                             [  ]3 pts        [  ]0pts    PSYCHOLOGICAL DISEASE                     ADD, OCD, Bipolar, Schizophrenia        [  ]2 pts         [  ]2 pts                      Depression                                               [  ]1 pt           [  ]1 pt           SCORING TOTAL   (add numbers and type here)              (**0*)                                     A score of 3 or lower indicated LOW risk for future opioid abuse  A score of 4 to 7 indicated moderate risk for future opioid abuse  A score of 8 or higher indicates a high risk for opioid abuse

## 2023-03-20 NOTE — H&P PST ADULT - NSICDXPASTSURGICALHX_GEN_ALL_CORE_FT
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

## 2023-03-20 NOTE — H&P PST ADULT - PROBLEM SELECTOR PLAN 4
Thyroid panel performed. Continue medications as instructed.  EKG performed.  Patient instructed to take levothyroxine with a sip of water day of surgery, verbalized understanding.   Medical clearance pending

## 2023-03-20 NOTE — H&P PST ADULT - PROBLEM SELECTOR PLAN 1
Labs and EKG performed.  Scheduled for pelvic exam under anesthesia, robotic assisted total laparoscopic, hysterectomy, bilateral salpingo-oophorectomy, possible laparotomy, sentinel lymph node mapping with dissection on 3/27/2023 with Dr. Mark pending medical clearance.   Patient to have medical clearance with [facility]   Written and verbal instructions provided.  Patient educated on surgical scrub, preadmission instructions, clearance and day of procedure medications, verbalizes understanding.  Patient instructed to stop vitamins/supplements/herbal medications/ASA/NSAIDS for one week prior to surgery and discuss with PMD.  Patient verbalized understanding of instructions and was given the opportunity to ask questions and have them answered.

## 2023-03-21 ENCOUNTER — RESULT REVIEW (OUTPATIENT)
Age: 53
End: 2023-03-21

## 2023-03-21 ENCOUNTER — NON-APPOINTMENT (OUTPATIENT)
Age: 53
End: 2023-03-21

## 2023-03-23 ENCOUNTER — TRANSCRIPTION ENCOUNTER (OUTPATIENT)
Age: 53
End: 2023-03-23

## 2023-03-27 ENCOUNTER — NON-APPOINTMENT (OUTPATIENT)
Age: 53
End: 2023-03-27

## 2023-03-27 ENCOUNTER — TRANSCRIPTION ENCOUNTER (OUTPATIENT)
Age: 53
End: 2023-03-27

## 2023-03-28 ENCOUNTER — NON-APPOINTMENT (OUTPATIENT)
Age: 53
End: 2023-03-28

## 2023-03-31 ENCOUNTER — APPOINTMENT (OUTPATIENT)
Dept: GYNECOLOGIC ONCOLOGY | Facility: CLINIC | Age: 53
End: 2023-03-31
Payer: COMMERCIAL

## 2023-03-31 PROCEDURE — 99443: CPT

## 2023-04-10 NOTE — ASSESSMENT
[FreeTextEntry1] : 52 y/o patient presents to review results of D&C. Final pathology still shows a component of endometrial hyperplasia w/atypia. A thickened area noted as a polyp intraoperatively is confirmed as endometrial hyperplasia & as a result, this procedure confirms her diagnosis as rendered by [facility], & my recommendation for hysterectomy stands, per national guidelines. Alternative option would be medical hormone therapy with LNG-IUD, although patient aware of the concerns regarding progression of hyperplasia. LNG-IUD would also require interval EMB q3m to evaluate treatment effect.  Preferable to avoid hormone therapy with OCPs. \par \par In regards to hysterectomy, also discussed with pt resection of B/L ovaries & ramifications of absent hormones in a woman her age. Pt would prefer postponing a surgical procedure to November as it regards her finances & family life. It is not unreasonable to consider LNG-IUD insertion now with interval EMB, & postponing surgical recommendation for the Fall. Will obtain Mirena IUD authorization & schedule pt for insertion. \par \par Patient states given her gym habits, would require testosterone & low dose estrogen in consideration of surgery & B/L resection of ovaries. Will re-address following final decision/surgery.

## 2023-04-10 NOTE — REASON FOR VISIT
[Home] : at home, [unfilled] , at the time of the visit. [Medical Office: ([facility])___] : at the medical office located in  [Other ___] : [unfilled] [de-identified] : 3/21/22 [de-identified] : D&C hysteroscopy [de-identified] : \par

## 2023-04-10 NOTE — DISCUSSION/SUMMARY
[FreeTextEntry1] : Final Diagnosis\par 1.  Uterine cervix, endocervical curettage\par -Benign endocervical tissue and squamous mucosa\par \par \par 2.  Uterus, endometrial curettage/biopsy\par -Scant fragments of histologically benign/inactive endometrial glands\par -Benign squamous epithelium\par \par 3.  Uterus, endometrial polypectomy and shaving\par -Scant fragments of atypical endometrial hyperplasia.  No evidence of\par exogenous hormonal effect.\par Multiple levels examined.

## 2023-04-10 NOTE — END OF VISIT
[Time Spent: ___ minutes] : I have spent [unfilled] minutes of time on the encounter. [FreeTextEntry3] : Written by Gia Espinal, acting as a scribe for Dr. Caty Mark.\par This note accurately reflects the work and decisions made by me.

## 2023-04-20 ENCOUNTER — TRANSCRIPTION ENCOUNTER (OUTPATIENT)
Age: 53
End: 2023-04-20

## 2023-04-21 ENCOUNTER — OUTPATIENT (OUTPATIENT)
Dept: OUTPATIENT SERVICES | Facility: HOSPITAL | Age: 53
LOS: 1 days | End: 2023-04-21
Payer: MEDICARE

## 2023-04-21 VITALS
WEIGHT: 120.37 LBS | RESPIRATION RATE: 16 BRPM | SYSTOLIC BLOOD PRESSURE: 110 MMHG | TEMPERATURE: 97 F | OXYGEN SATURATION: 99 % | HEART RATE: 62 BPM | HEIGHT: 62 IN | DIASTOLIC BLOOD PRESSURE: 70 MMHG

## 2023-04-21 DIAGNOSIS — Z98.890 OTHER SPECIFIED POSTPROCEDURAL STATES: Chronic | ICD-10-CM

## 2023-04-21 DIAGNOSIS — Z41.9 ENCOUNTER FOR PROCEDURE FOR PURPOSES OTHER THAN REMEDYING HEALTH STATE, UNSPECIFIED: Chronic | ICD-10-CM

## 2023-04-21 DIAGNOSIS — Z98.89 OTHER SPECIFIED POSTPROCEDURAL STATES: Chronic | ICD-10-CM

## 2023-04-21 DIAGNOSIS — Z01.818 ENCOUNTER FOR OTHER PREPROCEDURAL EXAMINATION: ICD-10-CM

## 2023-04-21 LAB
A1C WITH ESTIMATED AVERAGE GLUCOSE RESULT: 5.5 % — SIGNIFICANT CHANGE UP (ref 4–5.6)
ALBUMIN SERPL ELPH-MCNC: 4.7 G/DL — SIGNIFICANT CHANGE UP (ref 3.3–5.2)
ALP SERPL-CCNC: 87 U/L — SIGNIFICANT CHANGE UP (ref 40–120)
ALT FLD-CCNC: 14 U/L — SIGNIFICANT CHANGE UP
ANION GAP SERPL CALC-SCNC: 14 MMOL/L — SIGNIFICANT CHANGE UP (ref 5–17)
APPEARANCE UR: ABNORMAL
APTT BLD: 30.3 SEC — SIGNIFICANT CHANGE UP (ref 27.5–35.5)
AST SERPL-CCNC: 23 U/L — SIGNIFICANT CHANGE UP
BACTERIA # UR AUTO: ABNORMAL
BASOPHILS # BLD AUTO: 0.05 K/UL — SIGNIFICANT CHANGE UP (ref 0–0.2)
BASOPHILS NFR BLD AUTO: 0.7 % — SIGNIFICANT CHANGE UP (ref 0–2)
BILIRUB SERPL-MCNC: 0.4 MG/DL — SIGNIFICANT CHANGE UP (ref 0.4–2)
BILIRUB UR-MCNC: NEGATIVE — SIGNIFICANT CHANGE UP
BLD GP AB SCN SERPL QL: SIGNIFICANT CHANGE UP
BUN SERPL-MCNC: 11.5 MG/DL — SIGNIFICANT CHANGE UP (ref 8–20)
CALCIUM SERPL-MCNC: 9.4 MG/DL — SIGNIFICANT CHANGE UP (ref 8.4–10.5)
CHLORIDE SERPL-SCNC: 106 MMOL/L — SIGNIFICANT CHANGE UP (ref 96–108)
CO2 SERPL-SCNC: 21 MMOL/L — LOW (ref 22–29)
COLOR SPEC: YELLOW — SIGNIFICANT CHANGE UP
COMMENT - URINE: SIGNIFICANT CHANGE UP
CREAT SERPL-MCNC: 0.97 MG/DL — SIGNIFICANT CHANGE UP (ref 0.5–1.3)
DIFF PNL FLD: ABNORMAL
EGFR: 70 ML/MIN/1.73M2 — SIGNIFICANT CHANGE UP
EOSINOPHIL # BLD AUTO: 0.2 K/UL — SIGNIFICANT CHANGE UP (ref 0–0.5)
EOSINOPHIL NFR BLD AUTO: 2.8 % — SIGNIFICANT CHANGE UP (ref 0–6)
EPI CELLS # UR: SIGNIFICANT CHANGE UP
ESTIMATED AVERAGE GLUCOSE: 111 MG/DL — SIGNIFICANT CHANGE UP (ref 68–114)
GLUCOSE SERPL-MCNC: 88 MG/DL — SIGNIFICANT CHANGE UP (ref 70–99)
GLUCOSE UR QL: NEGATIVE MG/DL — SIGNIFICANT CHANGE UP
HCT VFR BLD CALC: 41.8 % — SIGNIFICANT CHANGE UP (ref 34.5–45)
HGB BLD-MCNC: 13.6 G/DL — SIGNIFICANT CHANGE UP (ref 11.5–15.5)
IMM GRANULOCYTES NFR BLD AUTO: 0.1 % — SIGNIFICANT CHANGE UP (ref 0–0.9)
INR BLD: 1.13 RATIO — SIGNIFICANT CHANGE UP (ref 0.88–1.16)
KETONES UR-MCNC: NEGATIVE — SIGNIFICANT CHANGE UP
LEUKOCYTE ESTERASE UR-ACNC: ABNORMAL
LYMPHOCYTES # BLD AUTO: 2.37 K/UL — SIGNIFICANT CHANGE UP (ref 1–3.3)
LYMPHOCYTES # BLD AUTO: 33 % — SIGNIFICANT CHANGE UP (ref 13–44)
MAGNESIUM SERPL-MCNC: 2.1 MG/DL — SIGNIFICANT CHANGE UP (ref 1.6–2.6)
MCHC RBC-ENTMCNC: 29.1 PG — SIGNIFICANT CHANGE UP (ref 27–34)
MCHC RBC-ENTMCNC: 32.5 GM/DL — SIGNIFICANT CHANGE UP (ref 32–36)
MCV RBC AUTO: 89.3 FL — SIGNIFICANT CHANGE UP (ref 80–100)
MONOCYTES # BLD AUTO: 0.5 K/UL — SIGNIFICANT CHANGE UP (ref 0–0.9)
MONOCYTES NFR BLD AUTO: 7 % — SIGNIFICANT CHANGE UP (ref 2–14)
NEUTROPHILS # BLD AUTO: 4.06 K/UL — SIGNIFICANT CHANGE UP (ref 1.8–7.4)
NEUTROPHILS NFR BLD AUTO: 56.4 % — SIGNIFICANT CHANGE UP (ref 43–77)
NITRITE UR-MCNC: NEGATIVE — SIGNIFICANT CHANGE UP
PH UR: 7 — SIGNIFICANT CHANGE UP (ref 5–8)
PHOSPHATE SERPL-MCNC: 3.2 MG/DL — SIGNIFICANT CHANGE UP (ref 2.4–4.7)
PLATELET # BLD AUTO: 345 K/UL — SIGNIFICANT CHANGE UP (ref 150–400)
POTASSIUM SERPL-MCNC: 4 MMOL/L — SIGNIFICANT CHANGE UP (ref 3.5–5.3)
POTASSIUM SERPL-SCNC: 4 MMOL/L — SIGNIFICANT CHANGE UP (ref 3.5–5.3)
PROT SERPL-MCNC: 7.5 G/DL — SIGNIFICANT CHANGE UP (ref 6.6–8.7)
PROT UR-MCNC: NEGATIVE — SIGNIFICANT CHANGE UP
PROTHROM AB SERPL-ACNC: 13.1 SEC — SIGNIFICANT CHANGE UP (ref 10.5–13.4)
RBC # BLD: 4.68 M/UL — SIGNIFICANT CHANGE UP (ref 3.8–5.2)
RBC # FLD: 12.8 % — SIGNIFICANT CHANGE UP (ref 10.3–14.5)
RBC CASTS # UR COMP ASSIST: ABNORMAL /HPF (ref 0–4)
SODIUM SERPL-SCNC: 141 MMOL/L — SIGNIFICANT CHANGE UP (ref 135–145)
SP GR SPEC: 1 — LOW (ref 1.01–1.02)
UROBILINOGEN FLD QL: NEGATIVE MG/DL — SIGNIFICANT CHANGE UP
WBC # BLD: 7.19 K/UL — SIGNIFICANT CHANGE UP (ref 3.8–10.5)
WBC # FLD AUTO: 7.19 K/UL — SIGNIFICANT CHANGE UP (ref 3.8–10.5)
WBC UR QL: ABNORMAL /HPF (ref 0–5)

## 2023-04-21 PROCEDURE — 93005 ELECTROCARDIOGRAM TRACING: CPT

## 2023-04-21 PROCEDURE — 93010 ELECTROCARDIOGRAM REPORT: CPT

## 2023-04-21 PROCEDURE — G0463: CPT

## 2023-04-21 RX ORDER — CHOLECALCIFEROL (VITAMIN D3) 125 MCG
1 CAPSULE ORAL
Qty: 0 | Refills: 0 | DISCHARGE

## 2023-04-21 RX ORDER — DEXTROSE 10 % IN WATER 10 %
0 INTRAVENOUS SOLUTION INTRAVENOUS
Qty: 0 | Refills: 0 | DISCHARGE

## 2023-04-21 RX ORDER — UBIDECARENONE 100 MG
1 CAPSULE ORAL
Qty: 0 | Refills: 0 | DISCHARGE

## 2023-04-21 NOTE — H&P PST ADULT - ATTENDING COMMENTS
Patient seen in presurgical holding area with her daughter present for the informed consent discussion.  R/B/A were reviewed & understood; consent is signed & witnessed in the chart.

## 2023-04-21 NOTE — H&P PST ADULT - ASSESSMENT
This is a pleasant slightly anxious 53 year old female in NAD ,  via  x 3 , LMP 3/2021, PMH includes migraines, hypothyroidism and newly diagnosed endometrial cancer. She presents today for PST for upcoming surgical procedure. Patient with newly diagnosed endometrial cancer. Patient with 1 episode of PMB 2022. As per patient  US performed Dec 2022, which demonstrated endometrial thickening. As per Dr. Mark' note patient underwent EMB on 23 by Dr. Major with pathology revealing small fragments of adenocarcinoma, endometrioid type, well differentiated, FIGO 1/3. She has a family history of ovarian cancer- mother dx in her 60's. Reports genetic testing performed >10 years ago was negative. She is now scheduled for pelvic exam under anesthesia, robotic assisted total laparoscopic, hysterectomy, bilateral salpingo-oophorectomy, possible laparotomy, sentinel lymph node mapping with dissection on 3/27/2023 with Dr. Mark pending medical clearance.       CAPRINI SCORE    AGE RELATED RISK FACTORS                                                             [X ] Age 41-60 years                                            (1 Point)  [ ] Age: 61-74 years                                           (2 Points)                 [ ] Age= 75 years                                                (3 Points)             DISEASE RELATED RISK FACTORS                                                       [ ] Edema in the lower extremities                 (1 Point)                     [ ] Varicose veins                                               (1 Point)                                 [ ] BMI > 25 Kg/m2                                            (1 Point)                                  [ ] Serious infection (ie PNA)                            (1 Point)                     [ ] Lung disease ( COPD, Emphysema)            (1 Point)                                                                          [ ] Acute myocardial infarction                         (1 Point)                  [ ] Congestive heart failure (in the previous month)  (1 Point)         [ ] Inflammatory bowel disease                            (1 Point)                  [ ] Central venous access, PICC or Port               (2 points)       (within the last month)                                                                [ ] Stroke (in the previous month)                        (5 Points)    [ X] Previous or present malignancy                       (2 points)                                                                                                                                                         HEMATOLOGY RELATED FACTORS                                                         [ ] Prior episodes of VTE                                     (3 Points)                     [ ] Positive family history for VTE                      (3 Points)                  [ ] Prothrombin 76155 A                                     (3 Points)                     [ ] Factor V Leiden                                                (3 Points)                        [ ] Lupus anticoagulants                                      (3 Points)                                                           [ ] Anticardiolipin antibodies                              (3 Points)                                                       [ ] High homocysteine in the blood                   (3 Points)                                             [ ] Other congenital or acquired thrombophilia      (3 Points)                                                [ ] Heparin induced thrombocytopenia                  (3 Points)                                        MOBILITY RELATED FACTORS  [ ] Bed rest                                                         (1 Point)  [ ] Plaster cast                                                    (2 points)  [ ] Bed bound for more than 72 hours           (2 Points)    GENDER SPECIFIC FACTORS  [ ] Pregnancy or had a baby within the last month   (1 Point)  [ ] Post-partum < 6 weeks                                   (1 Point)  [X ] Hormonal therapy  or oral contraception   (1 Point)  [ ] History of pregnancy complications              (1 point)  [ ] Unexplained or recurrent              (1 Point)    OTHER RISK FACTORS                                           (1 Point)  [ X] BMI >40, smoking, diabetes requiring insulin, chemotherapy  blood transfusions and length of surgery over 2 hours    SURGERY RELATED RISK FACTORS  [ ]  Section within the last month     (1 Point)  [ ] Minor surgery                                                  (1 Point)  [ ] Arthroscopic surgery                                       (2 Points)  [X ] Planned major surgery lasting more            (2 Points)      than 45 minutes     [ ] Elective hip or knee joint replacement       (5 points)       surgery                                                TRAUMA RELATED RISK FACTORS  [ ] Fracture of the hip, pelvis, or leg                       (5 Points)  [ ] Spinal cord injury resulting in paralysis             (5 points)       (in the previous month)    [ ] Paralysis  (less than 1 month)                             (5 Points)  [ ] Multiple Trauma within 1 month                        (5 Points)    Total Score [     7   ]    Caprini Score 0-2: Low Risk, NO VTE prophylaxis required for most patients, encourage ambulation  Caprini Score 3-6: Moderate Risk , pharmacologic VTE prophylaxis is indicated for most patients (in the absence of contraindications)  Caprini Score Greater than or =7: High risk, pharmocologic VTE prophylaxis indicated for most patients (in the absence of contraindications)      OPIOID RISK TOOL    KINJAL EACH BOX THAT APPLIES AND ADD TOTALS AT THE END    FAMILY HISTORY OF SUBSTANCE ABUSE                 FEMALE         MALE                                                Alcohol                             [  ]1 pt          [  ]3pts                                               Illegal Durgs                     [  ]2 pts        [  ]3pts                                               Rx Drugs                           [  ]4 pts        [  ]4 pts    PERSONAL HISTORY OF SUBSTANCE ABUSE                                                                                          Alcohol                             [  ]3 pts       [  ]3 pts                                               Illegal Drugs                     [  ]4 pts        [  ]4 pts                                               Rx Drugs                           [  ]5 pts        [  ]5 pts    AGE BETWEEN 16-45 YEARS                                      [  ]1 pt         [  ]1 pt    HISTORY OF PREADOLESCENT   SEXUAL ABUSE                                                             [  ]3 pts        [  ]0pts    PSYCHOLOGICAL DISEASE                     ADD, OCD, Bipolar, Schizophrenia        [  ]2 pts         [  ]2 pts                      Depression                                               [  ]1 pt           [  ]1 pt           SCORING TOTAL   (add numbers and type here)              (**0*)                                     A score of 3 or lower indicated LOW risk for future opioid abuse  A score of 4 to 7 indicated moderate risk for future opioid abuse  A score of 8 or higher indicates a high risk for opioid abuse

## 2023-04-21 NOTE — H&P PST ADULT - BIRTH SEX
Debridement Text: The wound edges were debrided prior to proceeding with the closure to facilitate wound healing. Female

## 2023-04-21 NOTE — H&P PST ADULT - PROBLEM SELECTOR PLAN 1
Labs and EKG performed.  Scheduled for pelvic exam under anesthesia, robotic assisted total laparoscopic, hysterectomy, bilateral salpingo-oophorectomy, possible laparotomy, sentinel lymph node mapping with dissection on 4/24/2023 with Dr. Mark pending medical clearance.   Patient to have medical clearance with [facility]   Written and verbal instructions provided.  Patient educated on surgical scrub, preadmission instructions, clearance and day of procedure medications, verbalizes understanding.  Patient instructed to stop vitamins/supplements/herbal medications/ASA/NSAIDS for one week prior to surgery and discuss with PMD.  Patient verbalized understanding of instructions and was given the opportunity to ask questions and have them answered.

## 2023-04-21 NOTE — H&P PST ADULT - AS BP NONINV METHOD
suicidal ideation with plan and means prior to admission but not currently/suicidal ideation with plan and means auscultated w/ stethoscope

## 2023-04-21 NOTE — H&P PST ADULT - NSICDXPASTSURGICALHX_GEN_ALL_CORE_FT
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

## 2023-04-21 NOTE — H&P PST ADULT - NSSUBSTANCEUSE_GEN_ALL_CORE_SD
Chief Complaint   Patient presents with    Foot Pain     Right     4/10 pain. denies use of illicit substances/caffeine

## 2023-04-21 NOTE — H&P PST ADULT - HISTORY OF PRESENT ILLNESS
53 year old female ,  via  x 3 , LMP 3/2021, PMH includes migraines, hypothyroidism and newly diagnosed endometrial cancer. She presents today for PST for upcoming surgical procedure. Patient reports in May 2020 she started experiencing bilateral leg swelling and unexplained weight gain which she initially contributed to her  thyroid (hx of Hashimoto's). She presented to her GYN, blood work performed and was told she had no circulating hormones. She then reports over the time frame of 1.5 years she was on oral estrogen, progesterone, cortisol, T3 as well as bioidentical  hormones in pill and cream form. She reports no change in her symptoms, and she presented to Dr. Major in 3/2021 and was prescribed vaginal progesterone and had menstrual cycle x 1, she stopped vaginal progesterone in 2021, with no further episodes of vaginal bleeding.  She then followed with Dr. Radha Gutierrez who specializes in menopausal symptoms and was prescribed estradiol patch x 4 months (starting 2022)  it was increased in 2022. She then began to take oral bioidentical progesterone in 2022. Patient started "tweaking" her medications herself. She decreased her estrogen 0.025mg in 2020, increased her progesterone 200mg x 12 days, increased her levothyroxine she took 200mcg on Sundays and 100mcg daily the rest of the week, stats that with this regimen she started to feel better. She repeated this regimen in Dec 2022, but on 2022 she had episode of heavy vaginal bleeding  lasting 7 days with associated pelvic cramping and ovarian discomfort which prompted workup. US performed Dec 2022, which demonstrated endometrial thickening. Patient underwent EMB on 23 by Dr. Major with pathology revealing small fragments of adenocarcinoma, endometrioid type, well differentiated, FIGO 1/3. Reports continued pelvic cramping, lower back pain.  Denies repeat episodes of bleeding. Denies abdominal pain/bloating/distension, changes in normal bowel/urinary habits, nausea/vomiting, unintentional weight loss/gain, and all other associated signs and symptoms. Reports mother with hx of ovarian cancer dx in her 60's. Reports genetic testing performed >10 years ago was negative. She is now scheduled for pelvic exam under anesthesia, robotic assisted total laparoscopic, hysterectomy, bilateral salpingo-oophorectomy, possible laparotomy, sentinel lymph node mapping with dissection on 23 with Dr. Mark pending medical clearance.  She was redirected to have a second opinion that is why there was a delay in the surgical treatment

## 2023-04-22 LAB
CULTURE RESULTS: SIGNIFICANT CHANGE UP
SPECIMEN SOURCE: SIGNIFICANT CHANGE UP

## 2023-04-23 ENCOUNTER — TRANSCRIPTION ENCOUNTER (OUTPATIENT)
Age: 53
End: 2023-04-23

## 2023-04-24 ENCOUNTER — RESULT REVIEW (OUTPATIENT)
Age: 53
End: 2023-04-24

## 2023-04-24 ENCOUNTER — INPATIENT (INPATIENT)
Facility: HOSPITAL | Age: 53
LOS: 0 days | Discharge: HOME | DRG: 743 | End: 2023-04-25
Attending: OBSTETRICS & GYNECOLOGY | Admitting: OBSTETRICS & GYNECOLOGY
Payer: COMMERCIAL

## 2023-04-24 ENCOUNTER — TRANSCRIPTION ENCOUNTER (OUTPATIENT)
Age: 53
End: 2023-04-24

## 2023-04-24 ENCOUNTER — FORM ENCOUNTER (OUTPATIENT)
Age: 53
End: 2023-04-24

## 2023-04-24 VITALS
HEART RATE: 87 BPM | HEIGHT: 62 IN | RESPIRATION RATE: 15 BRPM | WEIGHT: 120.37 LBS | TEMPERATURE: 98 F | DIASTOLIC BLOOD PRESSURE: 122 MMHG | OXYGEN SATURATION: 99 % | SYSTOLIC BLOOD PRESSURE: 143 MMHG

## 2023-04-24 DIAGNOSIS — C54.1 MALIGNANT NEOPLASM OF ENDOMETRIUM: ICD-10-CM

## 2023-04-24 DIAGNOSIS — Z98.890 OTHER SPECIFIED POSTPROCEDURAL STATES: Chronic | ICD-10-CM

## 2023-04-24 DIAGNOSIS — Z41.9 ENCOUNTER FOR PROCEDURE FOR PURPOSES OTHER THAN REMEDYING HEALTH STATE, UNSPECIFIED: Chronic | ICD-10-CM

## 2023-04-24 DIAGNOSIS — G89.18 OTHER ACUTE POSTPROCEDURAL PAIN: ICD-10-CM

## 2023-04-24 DIAGNOSIS — Z98.89 OTHER SPECIFIED POSTPROCEDURAL STATES: Chronic | ICD-10-CM

## 2023-04-24 LAB
ALBUMIN SERPL ELPH-MCNC: 3.7 G/DL — SIGNIFICANT CHANGE UP (ref 3.3–5.2)
ALP SERPL-CCNC: 74 U/L — SIGNIFICANT CHANGE UP (ref 40–120)
ALT FLD-CCNC: 13 U/L — SIGNIFICANT CHANGE UP
ANION GAP SERPL CALC-SCNC: 14 MMOL/L — SIGNIFICANT CHANGE UP (ref 5–17)
AST SERPL-CCNC: 17 U/L — SIGNIFICANT CHANGE UP
BASOPHILS # BLD AUTO: 0.02 K/UL — SIGNIFICANT CHANGE UP (ref 0–0.2)
BASOPHILS NFR BLD AUTO: 0.1 % — SIGNIFICANT CHANGE UP (ref 0–2)
BILIRUB SERPL-MCNC: 0.3 MG/DL — LOW (ref 0.4–2)
BUN SERPL-MCNC: 9.8 MG/DL — SIGNIFICANT CHANGE UP (ref 8–20)
CALCIUM SERPL-MCNC: 8.7 MG/DL — SIGNIFICANT CHANGE UP (ref 8.4–10.5)
CHLORIDE SERPL-SCNC: 108 MMOL/L — SIGNIFICANT CHANGE UP (ref 96–108)
CO2 SERPL-SCNC: 18 MMOL/L — LOW (ref 22–29)
CREAT SERPL-MCNC: 0.77 MG/DL — SIGNIFICANT CHANGE UP (ref 0.5–1.3)
EGFR: 93 ML/MIN/1.73M2 — SIGNIFICANT CHANGE UP
EOSINOPHIL # BLD AUTO: 0.01 K/UL — SIGNIFICANT CHANGE UP (ref 0–0.5)
EOSINOPHIL NFR BLD AUTO: 0.1 % — SIGNIFICANT CHANGE UP (ref 0–6)
GLUCOSE SERPL-MCNC: 146 MG/DL — HIGH (ref 70–99)
HCT VFR BLD CALC: 38.7 % — SIGNIFICANT CHANGE UP (ref 34.5–45)
HGB BLD-MCNC: 12.7 G/DL — SIGNIFICANT CHANGE UP (ref 11.5–15.5)
IMM GRANULOCYTES NFR BLD AUTO: 0.4 % — SIGNIFICANT CHANGE UP (ref 0–0.9)
LYMPHOCYTES # BLD AUTO: 0.86 K/UL — LOW (ref 1–3.3)
LYMPHOCYTES # BLD AUTO: 6.4 % — LOW (ref 13–44)
MCHC RBC-ENTMCNC: 29.4 PG — SIGNIFICANT CHANGE UP (ref 27–34)
MCHC RBC-ENTMCNC: 32.8 GM/DL — SIGNIFICANT CHANGE UP (ref 32–36)
MCV RBC AUTO: 89.6 FL — SIGNIFICANT CHANGE UP (ref 80–100)
MONOCYTES # BLD AUTO: 0.36 K/UL — SIGNIFICANT CHANGE UP (ref 0–0.9)
MONOCYTES NFR BLD AUTO: 2.7 % — SIGNIFICANT CHANGE UP (ref 2–14)
NEUTROPHILS # BLD AUTO: 12.06 K/UL — HIGH (ref 1.8–7.4)
NEUTROPHILS NFR BLD AUTO: 90.3 % — HIGH (ref 43–77)
PLATELET # BLD AUTO: 249 K/UL — SIGNIFICANT CHANGE UP (ref 150–400)
POTASSIUM SERPL-MCNC: 3.8 MMOL/L — SIGNIFICANT CHANGE UP (ref 3.5–5.3)
POTASSIUM SERPL-SCNC: 3.8 MMOL/L — SIGNIFICANT CHANGE UP (ref 3.5–5.3)
PROT SERPL-MCNC: 6.2 G/DL — LOW (ref 6.6–8.7)
RBC # BLD: 4.32 M/UL — SIGNIFICANT CHANGE UP (ref 3.8–5.2)
RBC # FLD: 12.6 % — SIGNIFICANT CHANGE UP (ref 10.3–14.5)
SODIUM SERPL-SCNC: 140 MMOL/L — SIGNIFICANT CHANGE UP (ref 135–145)
WBC # BLD: 13.37 K/UL — HIGH (ref 3.8–10.5)
WBC # FLD AUTO: 13.37 K/UL — HIGH (ref 3.8–10.5)

## 2023-04-24 PROCEDURE — 88112 CYTOPATH CELL ENHANCE TECH: CPT | Mod: 26

## 2023-04-24 PROCEDURE — 88307 TISSUE EXAM BY PATHOLOGIST: CPT | Mod: 26

## 2023-04-24 PROCEDURE — 88309 TISSUE EXAM BY PATHOLOGIST: CPT | Mod: 26

## 2023-04-24 DEVICE — INTERCEED 3 X 4"
Type: IMPLANTABLE DEVICE | Status: NON-FUNCTIONAL
Removed: 2023-04-24

## 2023-04-24 DEVICE — ARISTA 3GR
Type: IMPLANTABLE DEVICE | Status: NON-FUNCTIONAL
Removed: 2023-04-24

## 2023-04-24 RX ORDER — CELECOXIB 200 MG/1
400 CAPSULE ORAL ONCE
Refills: 0 | Status: COMPLETED | OUTPATIENT
Start: 2023-04-24 | End: 2023-04-24

## 2023-04-24 RX ORDER — ACETAMINOPHEN 500 MG
975 TABLET ORAL EVERY 6 HOURS
Refills: 0 | Status: DISCONTINUED | OUTPATIENT
Start: 2023-04-24 | End: 2023-04-25

## 2023-04-24 RX ORDER — METRONIDAZOLE 500 MG
500 TABLET ORAL ONCE
Refills: 0 | Status: DISCONTINUED | OUTPATIENT
Start: 2023-04-24 | End: 2023-04-24

## 2023-04-24 RX ORDER — TRAMADOL HYDROCHLORIDE 50 MG/1
1 TABLET ORAL
Qty: 6 | Refills: 0
Start: 2023-04-24

## 2023-04-24 RX ORDER — LEVOTHYROXINE SODIUM 125 MCG
100 TABLET ORAL DAILY
Refills: 0 | Status: DISCONTINUED | OUTPATIENT
Start: 2023-04-24 | End: 2023-04-25

## 2023-04-24 RX ORDER — TOPIRAMATE 25 MG
100 TABLET ORAL
Refills: 0 | Status: DISCONTINUED | OUTPATIENT
Start: 2023-04-25 | End: 2023-04-25

## 2023-04-24 RX ORDER — HEPARIN SODIUM 5000 [USP'U]/ML
5000 INJECTION INTRAVENOUS; SUBCUTANEOUS ONCE
Refills: 0 | Status: DISCONTINUED | OUTPATIENT
Start: 2023-04-24 | End: 2023-04-24

## 2023-04-24 RX ORDER — GENTAMICIN SULFATE 40 MG/ML
275 VIAL (ML) INJECTION ONCE
Refills: 0 | Status: DISCONTINUED | OUTPATIENT
Start: 2023-04-24 | End: 2023-04-24

## 2023-04-24 RX ORDER — HYDROMORPHONE HYDROCHLORIDE 2 MG/ML
0.5 INJECTION INTRAMUSCULAR; INTRAVENOUS; SUBCUTANEOUS
Refills: 0 | Status: DISCONTINUED | OUTPATIENT
Start: 2023-04-24 | End: 2023-04-25

## 2023-04-24 RX ORDER — SODIUM CHLORIDE 9 MG/ML
1000 INJECTION, SOLUTION INTRAVENOUS
Refills: 0 | Status: DISCONTINUED | OUTPATIENT
Start: 2023-04-24 | End: 2023-04-25

## 2023-04-24 RX ORDER — BUPROPION HYDROCHLORIDE 150 MG/1
1 TABLET, EXTENDED RELEASE ORAL
Qty: 0 | Refills: 0 | DISCHARGE

## 2023-04-24 RX ORDER — TRAMADOL HYDROCHLORIDE 50 MG/1
1 TABLET ORAL
Qty: 12 | Refills: 0
Start: 2023-04-24 | End: 2023-04-26

## 2023-04-24 RX ORDER — HEPARIN SODIUM 5000 [USP'U]/ML
5000 INJECTION INTRAVENOUS; SUBCUTANEOUS EVERY 8 HOURS
Refills: 0 | Status: DISCONTINUED | OUTPATIENT
Start: 2023-04-24 | End: 2023-04-25

## 2023-04-24 RX ORDER — APREPITANT 80 MG/1
40 CAPSULE ORAL ONCE
Refills: 0 | Status: COMPLETED | OUTPATIENT
Start: 2023-04-24 | End: 2023-04-24

## 2023-04-24 RX ORDER — ONDANSETRON 8 MG/1
4 TABLET, FILM COATED ORAL ONCE
Refills: 0 | Status: DISCONTINUED | OUTPATIENT
Start: 2023-04-24 | End: 2023-04-25

## 2023-04-24 RX ORDER — BUPROPION HYDROCHLORIDE 150 MG/1
300 TABLET, EXTENDED RELEASE ORAL DAILY
Refills: 0 | Status: DISCONTINUED | OUTPATIENT
Start: 2023-04-24 | End: 2023-04-25

## 2023-04-24 RX ORDER — ONDANSETRON 8 MG/1
4 TABLET, FILM COATED ORAL EVERY 6 HOURS
Refills: 0 | Status: DISCONTINUED | OUTPATIENT
Start: 2023-04-24 | End: 2023-04-25

## 2023-04-24 RX ORDER — IBUPROFEN 200 MG
600 TABLET ORAL EVERY 6 HOURS
Refills: 0 | Status: DISCONTINUED | OUTPATIENT
Start: 2023-04-24 | End: 2023-04-25

## 2023-04-24 RX ORDER — ACETAMINOPHEN 500 MG
975 TABLET ORAL ONCE
Refills: 0 | Status: COMPLETED | OUTPATIENT
Start: 2023-04-24 | End: 2023-04-24

## 2023-04-24 RX ORDER — LEVOTHYROXINE SODIUM 125 MCG
1 TABLET ORAL
Qty: 0 | Refills: 0 | DISCHARGE

## 2023-04-24 RX ORDER — ACETAMINOPHEN 500 MG
2 TABLET ORAL
Qty: 24 | Refills: 0
Start: 2023-04-24 | End: 2023-04-26

## 2023-04-24 RX ADMIN — Medication 975 MILLIGRAM(S): at 15:43

## 2023-04-24 RX ADMIN — CELECOXIB 400 MILLIGRAM(S): 200 CAPSULE ORAL at 15:43

## 2023-04-24 RX ADMIN — APREPITANT 40 MILLIGRAM(S): 80 CAPSULE ORAL at 16:59

## 2023-04-24 NOTE — BRIEF OPERATIVE NOTE - NSICDXBRIEFPROCEDURE_GEN_ALL_CORE_FT
PROCEDURES:  Robot-assisted laparoscopic hysterectomy with salpingectomy and cystoscopy using da Ralph Xi 24-Apr-2023 20:22:17  Alexandria Rubio

## 2023-04-24 NOTE — CHART NOTE - NSCHARTNOTEFT_GEN_A_CORE
Late entry note    GYNECOLOGIC ONCOLOGY PROGRESS NOTE    52yo s/p robot-assisted laparoscopic hysterectomy with salpingectomy, SLND, and cystoscopy  for endometrial hyperplasia  POD#0      PROBLEMS:  1. endometrial hyperplasia  2. Hashimotos; hypothyroid      Pt seen and examined at bedside in PACU    SUBJECTIVE:  Patient reports painful to move due to left hip pain; is sleeping on her right side in order to keep left hip without pressure, moves freely upon repositioning  Otherwise, reports abdominal / incisional discomfort is controlled   Flatus: not yet  Denies Nausea, Vomiting or Diarrhea.  Denies shortness of breath, chest pain or dyspnea on exertion.  Tolerating PO liquid diet.  Feels urge to void    OBJECTIVE:     VITALS:  T(F): 98.2 (04-25-23 @ 01:00), Max: 98.7 (04-24-23 @ 23:00)  HR: 77 (04-25-23 @ 01:00) (69 - 87)  BP: 108/72 (04-25-23 @ 01:00) (91/65 - 143/122)  RR: 18 (04-25-23 @ 01:00) (12 - 18)  SpO2: 94% (04-25-23 @ 01:00) (94% - 100%)    I&O's Summary      MEDICATIONS  (STANDING):  buPROPion XL (24-Hour) . 300 milliGRAM(s) Oral daily  heparin   Injectable 5000 Unit(s) SubCutaneous every 8 hours  lactated ringers. 1000 milliLiter(s) (75 mL/Hr) IV Continuous <Continuous>  levothyroxine 100 MICROGram(s) Oral daily  topiramate 100 milliGRAM(s) Oral two times a day    MEDICATIONS  (PRN):  acetaminophen     Tablet .. 975 milliGRAM(s) Oral every 6 hours PRN Moderate Pain (4 - 6)  ibuprofen  Tablet. 600 milliGRAM(s) Oral every 6 hours PRN Moderate Pain (4 - 6)  ondansetron Injectable 4 milliGRAM(s) IV Push every 6 hours PRN Nausea and/or Vomiting      Physical Exam:  Constitutional: discomfort, no acute distress; somnolent with eyes closed, however arousable upon evaluation and follows commands, converesation, AOOx3  Abdomen: soft, non-tender, non-distended, normal bowel sounds  Extremities: no lower extremity edema or calve tenderness, Claude's sign negative. demonstrated full range of motion at left hip; + motor, +sensation  Incision: Clean, dry, intact with op sites x5. Without signs of infection or hernia.      LABS:                        12.7   13.37 )-----------( 249      ( 24 Apr 2023 22:11 )             38.7     04-24    140  |  108  |  9.8  ----------------------------<  146<H>  3.8   |  18.0<L>  |  0.77    Ca    8.7      24 Apr 2023 22:11    TPro  6.2<L>  /  Alb  3.7  /  TBili  0.3<L>  /  DBili  x   /  AST  17  /  ALT  13  /  AlkPhos  74  04-24            RADIOLOGY & ADDITIONAL TESTS:    52yo s/p robot-assisted laparoscopic hysterectomy with salpingectomy, SLND, and cystoscopy  for endometrial hyperplasia  POD#0      Neuro: MSK origin of discomfort at L hip, demonstrated ambulation, will readjust pain. Continue current pain regimen.  CV: No history of cardiovascular disease. Blood pressure well controlled.  Pulm: No active disease. Saturating well on room air. Incentive spirometer use encouraged  GI: No active disease. Bowel sounds and function normal, tolerating PO diet. Continue current bowel regimen.   : Dickerson removed, voided in PACU  Heme: Hgb 13.6 12.7   ID: Afebrile. No postoperative antibiotics indicated at this time.   Endo: No active disease. Hypothyroid > home synthroid ordered  FEN: IVF at 75. Will discontinue IVF when tolerating PO. Electrolytes WNL.   Skin: No active disease.   Psych: Buproprion for smoking cessation maintenance; No active disease.   DVT ppx: Ambulation encouraged, SCDs when in bed, heparin q8 ordered.  Dispo: AM rounds.

## 2023-04-24 NOTE — BRIEF OPERATIVE NOTE - OPERATION/FINDINGS
Normal external genitalia and urethra. Normal vagina and cervix. On laparoscopy, small anteverted uterus with normal bilateral fallopian tubes and ovaries. [place] lymph node mapping to R para-aortic lymph node and L external iliac lymph node. 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   On cystoscopy, normal L ureteral orifice and jet. No R ureteral orifice identified, on R trigone there was noted to be scarring. Methylene blue injected by anesthesia, still unable to visualize jet apart from L ureteral orifice. Trabeculations visualized in bladder. Normal external genitalia and urethra. Normal vagina and cervix. On laparoscopy, small anteverted uterus with normal bilateral fallopian tubes and ovaries. [place] lymph node mapping to R para-aortic lymph node and L external iliac lymph node. 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   On cystoscopy, normal L ureteral orifice and jet. No R ureteral orifice clearly identified, on R trigone there was noted to be scarring. Methylene blue injected by anesthesia, still unable to visualize jet apart from L ureteral orifice. Trabeculations visualized in bladder.

## 2023-04-24 NOTE — ASU DISCHARGE PLAN (ADULT/PEDIATRIC) - NS MD DC FALL RISK RISK
For information on Fall & Injury Prevention, visit: [email or web address] OR  [email or web address] OR  https://www.cdc.gov/steadi/patient.html

## 2023-04-24 NOTE — ASU DISCHARGE PLAN (ADULT/PEDIATRIC) - PROCEDURE
robotic assisted total laparoscopic hysterectomy, bilateral salpingo-oophorectomy and pelvic and para-aortic lymph node dissection

## 2023-04-24 NOTE — ASU DISCHARGE PLAN (ADULT/PEDIATRIC) - CARE PROVIDER_API CALL
Caty Mark)  Gynecologic Oncology; Obstetrics and Gynecology  [address] [place] [address]  Phone: [phone]  Fax: [phone]  Follow Up Time: 2 weeks

## 2023-04-24 NOTE — BRIEF OPERATIVE NOTE - SPECIMENS
1. R [id number] sentinel lymph node, 2. L external iliac sentinel lymph node, 3. Uterus with cervix, bilateral fallopian tubes

## 2023-04-25 ENCOUNTER — TRANSCRIPTION ENCOUNTER (OUTPATIENT)
Age: 53
End: 2023-04-25

## 2023-04-25 VITALS
HEART RATE: 73 BPM | OXYGEN SATURATION: 96 % | TEMPERATURE: 98 F | SYSTOLIC BLOOD PRESSURE: 127 MMHG | RESPIRATION RATE: 19 BRPM | DIASTOLIC BLOOD PRESSURE: 77 MMHG

## 2023-04-25 PROCEDURE — 80053 COMPREHEN METABOLIC PANEL: CPT

## 2023-04-25 PROCEDURE — 88112 CYTOPATH CELL ENHANCE TECH: CPT

## 2023-04-25 PROCEDURE — 88309 TISSUE EXAM BY PATHOLOGIST: CPT

## 2023-04-25 PROCEDURE — C9399: CPT

## 2023-04-25 PROCEDURE — 74177 CT ABD & PELVIS W/CONTRAST: CPT | Mod: 26

## 2023-04-25 PROCEDURE — 36415 COLL VENOUS BLD VENIPUNCTURE: CPT

## 2023-04-25 PROCEDURE — 88307 TISSUE EXAM BY PATHOLOGIST: CPT

## 2023-04-25 PROCEDURE — C1889: CPT

## 2023-04-25 PROCEDURE — C1765: CPT

## 2023-04-25 PROCEDURE — 85025 COMPLETE CBC W/AUTO DIFF WBC: CPT

## 2023-04-25 PROCEDURE — S2900: CPT

## 2023-04-25 PROCEDURE — 74177 CT ABD & PELVIS W/CONTRAST: CPT

## 2023-04-25 RX ORDER — FUROSEMIDE 40 MG
10 TABLET ORAL ONCE
Refills: 0 | Status: COMPLETED | OUTPATIENT
Start: 2023-04-25 | End: 2023-04-25

## 2023-04-25 RX ORDER — SODIUM CHLORIDE 9 MG/ML
1000 INJECTION INTRAMUSCULAR; INTRAVENOUS; SUBCUTANEOUS
Refills: 0 | Status: DISCONTINUED | OUTPATIENT
Start: 2023-04-25 | End: 2023-04-25

## 2023-04-25 RX ADMIN — SODIUM CHLORIDE 75 MILLILITER(S): 9 INJECTION, SOLUTION INTRAVENOUS at 01:12

## 2023-04-25 RX ADMIN — Medication 100 MILLIGRAM(S): at 14:24

## 2023-04-25 RX ADMIN — Medication 100 MICROGRAM(S): at 05:46

## 2023-04-25 RX ADMIN — SODIUM CHLORIDE 125 MILLILITER(S): 9 INJECTION INTRAMUSCULAR; INTRAVENOUS; SUBCUTANEOUS at 03:57

## 2023-04-25 RX ADMIN — Medication 975 MILLIGRAM(S): at 01:13

## 2023-04-25 RX ADMIN — Medication 600 MILLIGRAM(S): at 05:46

## 2023-04-25 RX ADMIN — Medication 600 MILLIGRAM(S): at 06:45

## 2023-04-25 RX ADMIN — Medication 975 MILLIGRAM(S): at 02:13

## 2023-04-25 RX ADMIN — HEPARIN SODIUM 5000 UNIT(S): 5000 INJECTION INTRAVENOUS; SUBCUTANEOUS at 05:47

## 2023-04-25 NOTE — DISCHARGE NOTE PROVIDER - NSDCMRMEDTOKEN_GEN_ALL_CORE_FT
buPROPion 150 mg/12 hours (SR) oral tablet, extended release: 1 tab(s) orally 2 times a day  levothyroxine 100 mcg (0.1 mg) oral tablet: 1 tab(s) orally once a day  naproxen 500 mg oral tablet: 1 tab(s) orally every 12 hours MDD: 2  Topamax 100 mg oral tablet: 1 tab(s) orally 2 times a day - AFTERNOON AND EVENING (TOTAL DOSE 200MG DAILY)  traMADol 100 mg oral tablet: 1 tab(s) orally every 6 hours MDD: 4 tablets  Tylenol Extra Strength 500 mg oral tablet: 2 tab(s) orally every 6 hours

## 2023-04-25 NOTE — PROGRESS NOTE ADULT - ATTENDING COMMENTS
Intraop findings & procedure were discussed with patient at bedside; voiding freely & appears well this AM.  No N/V - improved from immediately postop.  Reviewed prior h/o R ureteral surgery as adolescent & preop CT 3/8; advised of recommendation to obtain CT urogram prior to discharge - patient in agreement & order entered. Intraop findings & procedure were discussed with patient at bedside; voiding freely & appears well this AM.  No N/V - improved from immediately postop.  Reviewed prior h/o R ureteral surgery as adolescent & preop CT 3/8; advised of recommendation to obtain CT urogram prior to discharge - patient in agreement & order entered.    Addendum: Results of CT urogram reviewed- no concern for ureteral injury or dysfunction. The patient did not have questions and understands plan to follow up with Dr Mark in 2w. She has been ambulating, voiding, tolerating diet, passing gas. Vital signs and labs stable. I spoke with patient's mother regarding plan for discharge and order placed. She has pain medication at home already . Post op care reviewed.     Alexandria Rubio PGY5 Intraop findings & procedure were discussed with patient at bedside; voiding freely & appears well this AM.  No N/V - improved from immediately postop.  Reviewed prior h/o R ureteral surgery as adolescent & preop CT 3/8; advised of recommendation to obtain CT urogram prior to discharge - patient in agreement & order entered.    Addendum: Results of CT urogram reviewed- no concern for ureteral injury or dysfunction. The patient did not have questions and understands plan to follow up with Dr Mark in 2w. She has been ambulating, voiding, tolerating diet, passing gas. Vital signs and labs stable. I spoke with patient's mother regarding plan for discharge and order placed. She has pain medication at home already . Post op care reviewed.     Alexandria Ramiro PGY5    Daughter number: [phone]

## 2023-04-25 NOTE — DISCHARGE NOTE PROVIDER - NSDCCPCAREPLAN_GEN_ALL_CORE_FT
PRINCIPAL DISCHARGE DIAGNOSIS  Diagnosis: Endometrial hyperplasia  Assessment and Plan of Treatment:

## 2023-04-25 NOTE — DISCHARGE NOTE NURSING/CASE MANAGEMENT/SOCIAL WORK - PATIENT PORTAL LINK FT
You can access the FollowMyHealth Patient Portal offered by [facility] by registering at the following website: [email or web address]. By joining [employer]’s FollowMyHealth portal, you will also be able to view your health information using other applications (apps) compatible with our system.

## 2023-04-25 NOTE — DISCHARGE NOTE PROVIDER - CARE PROVIDER_API CALL
Caty Mark)  Gynecologic Oncology; Obstetrics and Gynecology  [address] [place] [address]  Phone: [phone]  Fax: [phone]  Follow Up Time:

## 2023-04-25 NOTE — DISCHARGE NOTE PROVIDER - NSDCCPTREATMENT_GEN_ALL_CORE_FT
PRINCIPAL PROCEDURE  Procedure: Robot-assisted laparoscopic hysterectomy with salpingectomy and cystoscopy using da Ralph Xi  Findings and Treatment:

## 2023-04-25 NOTE — PROGRESS NOTE ADULT - ASSESSMENT
A/P: CHASTITY ROSS is a 53y POD#1 HD#2 s/p  EUA, RA TLH, BS, SLND, cysto      Neuro: Pain well controlled with current regimen.   Cardiac: No active issues. BP well controlled   Pulmonary: No active disease. Incentive spirometer at bedside.   Endo: hypothyroidism home levothyroxine ordered   GI: Tolerating regular diet   : Voiding spontaneously. Urine output adequate. Preop creatinine 0.95  ID: No active disease. Preop WBC 7.19>13.37 likely reactive 2/2 surgery  Heme: Preop Hgb 13.6>12.7. SCDs when not ambulating, heparin for VTE prophylaxis.   Skin: No active disease. Dressing/wound c/d/i with opsites  Psych: No active problems   FEN: IVF at 125cc/hr, will discontinue once PO intake is adequate. Electrolytes wnl -> FU AM BMP  Dispo: likely home this AM pending AM rounds and attending approval

## 2023-04-25 NOTE — PATIENT PROFILE ADULT - STATED REASON FOR ADMISSION
Total lap hysterectomy with BL salpingo-oopherectomy and pelvic and para-aortic lymph node dissection

## 2023-04-25 NOTE — CHART NOTE - NSCHARTNOTEFT_GEN_A_CORE
Patient daughter Renea called hospital, mother reporting difficulty breathing since walking to the car. She reported gas like pain and chest pressure worse when lying flat. She tried gasX but has not had relief. She is not nauseous. The patient does not want to walk due to the discomfort. Instructed daughter to come to hospital for evaluation    Alexandria Rubio PGY5 Patient daughter Renea called hospital, mother reporting difficulty breathing since walking to the car. She reported gas like pain and chest pressure worse when lying flat. She tried gasX but has not had relief. She is not nauseous. The patient does not want to walk due to the discomfort. Instructed daughter to come to hospital for evaluation      Addendum: patient burped and felt improved, lying down on side sleeping now. No longer coming to hospital     Alexandria Rubio PGY5

## 2023-04-25 NOTE — DISCHARGE NOTE PROVIDER - NSDCACTIVITY_GEN_ALL_CORE
No restrictions/Walking - Indoors allowed/Walking - Outdoors allowed/Follow Instructions Provided by your Surgical Team

## 2023-04-25 NOTE — DISCHARGE NOTE PROVIDER - HOSPITAL COURSE
Patient admitted after EUA, RA TLH, BS, SLND, cysto She was transferred to the floor in stable condition after uneventful PACU recovery. Her hospital stay was uncomplicated. Upon discharge she was ambulating, voiding, tolerating PO. Pain well controlled with prn pain meds.      Patient admitted after EUA, RA TLH, BS, SLND, cysto. She was transferred to the floor in stable condition after uneventful PACU recovery. Her hospital stay was uncomplicated. Upon discharge she was ambulating, voiding, tolerating PO. Pain well controlled with prn pain meds.

## 2023-04-25 NOTE — PROGRESS NOTE ADULT - SUBJECTIVE AND OBJECTIVE BOX
GYNECOLOGIC ONCOLOGY PROGRESS NOTE    CHASTITY ROSS is a 53y POD#1 HD#2 s/p  EUA, RA TLH, BS, SLND, cysto      PROBLEMS:  Endometrial hyperplasia    SUBJECTIVE:  Pt seen and examined at bedside.   Patient is without complaints.  Pain well-controlled.  Flatus: no  Denies Nausea, Vomiting or Diarrhea.  Denies shortness of breath, chest pain or dyspnea on exertion.  Tolerating diet.    OBJECTIVE:   VITALS:  T(F): 98 (04-25-23 @ 04:57), Max: 98.7 (04-24-23 @ 23:00)  HR: 84 (04-25-23 @ 04:57) (69 - 87)  BP: 103/65 (04-25-23 @ 04:57) (91/65 - 143/122)  RR: 18 (04-25-23 @ 04:57) (12 - 18)  SpO2: 95% (04-25-23 @ 04:57) (94% - 100%)  Wt(kg): --    I&O's Summary    24 Apr 2023 07:01  -  25 Apr 2023 06:22  --------------------------------------------------------  IN: 675 mL / OUT: 0 mL / NET: 675 mL        Physical Exam:  Constitutional: NAD  Pulmonary: clear to auscultation bilaterally   Cardiovascular: Regular rate and rhythm   Abdomen: soft, non-tender, non-distended, normal bowel sounds  Extremities: no lower extremity edema or calve tenderness, Claude's sign negative.  Incisions: Clean, dry, intact. Without signs of infection or hernia. Opsites in place    LABS:                        12.7   13.37 )-----------( 249      ( 24 Apr 2023 22:11 )             38.7     04-24    140  |  108  |  9.8  ----------------------------<  146<H>  3.8   |  18.0<L>  |  0.77    Ca    8.7      24 Apr 2023 22:11    TPro  6.2<L>  /  Alb  3.7  /  TBili  0.3<L>  /  DBili  x   /  AST  17  /  ALT  13  /  AlkPhos  74  04-24          acetaminophen     Tablet .. 975 milliGRAM(s) Oral every 6 hours PRN  buPROPion XL (24-Hour) . 300 milliGRAM(s) Oral daily  heparin   Injectable 5000 Unit(s) SubCutaneous every 8 hours  ibuprofen  Tablet. 600 milliGRAM(s) Oral every 6 hours PRN  lactated ringers. 1000 milliLiter(s) IV Continuous <Continuous>  levothyroxine 100 MICROGram(s) Oral daily  ondansetron Injectable 4 milliGRAM(s) IV Push every 6 hours PRN  sodium chloride 0.9%. 1000 milliLiter(s) IV Continuous <Continuous>  topiramate 100 milliGRAM(s) Oral two times a day       GYNECOLOGIC ONCOLOGY PROGRESS NOTE    CHASTITY ROSS is a 53y POD#1 HD#2 s/p  EUA, RA TLH, BS, SLND, cysto      PROBLEMS:  Endometrial hyperplasia    SUBJECTIVE:  Pt seen and examined at bedside.   Patient complains of some pain, was able to take tylenol and ibuprofen overnight. Did not want to take the oxycodone.  Flatus: not yet  Denies Nausea, Vomiting or Diarrhea.  Denies shortness of breath, chest pain or dyspnea on exertion.  Tolerating diet.    OBJECTIVE:   VITALS:  T(F): 98 (04-25-23 @ 04:57), Max: 98.7 (04-24-23 @ 23:00)  HR: 84 (04-25-23 @ 04:57) (69 - 87)  BP: 103/65 (04-25-23 @ 04:57) (91/65 - 143/122)  RR: 18 (04-25-23 @ 04:57) (12 - 18)  SpO2: 95% (04-25-23 @ 04:57) (94% - 100%)  Wt(kg): --    I&O's Summary    24 Apr 2023 07:01  -  25 Apr 2023 06:22  --------------------------------------------------------  IN: 675 mL / OUT: 0 mL / NET: 675 mL        Physical Exam:  Constitutional: NAD  Pulmonary: clear to auscultation bilaterally   Cardiovascular: Regular rate and rhythm   Abdomen: soft, non-tender, non-distended, normal bowel sounds  Extremities: no lower extremity edema or calve tenderness, Claude's sign negative.  Incisions: Clean, dry, intact. Without signs of infection or hernia. Opsites in place    LABS:                        12.7   13.37 )-----------( 249      ( 24 Apr 2023 22:11 )             38.7     04-24    140  |  108  |  9.8  ----------------------------<  146<H>  3.8   |  18.0<L>  |  0.77    Ca    8.7      24 Apr 2023 22:11    TPro  6.2<L>  /  Alb  3.7  /  TBili  0.3<L>  /  DBili  x   /  AST  17  /  ALT  13  /  AlkPhos  74  04-24          acetaminophen     Tablet .. 975 milliGRAM(s) Oral every 6 hours PRN  buPROPion XL (24-Hour) . 300 milliGRAM(s) Oral daily  heparin   Injectable 5000 Unit(s) SubCutaneous every 8 hours  ibuprofen  Tablet. 600 milliGRAM(s) Oral every 6 hours PRN  lactated ringers. 1000 milliLiter(s) IV Continuous <Continuous>  levothyroxine 100 MICROGram(s) Oral daily  ondansetron Injectable 4 milliGRAM(s) IV Push every 6 hours PRN  sodium chloride 0.9%. 1000 milliLiter(s) IV Continuous <Continuous>  topiramate 100 milliGRAM(s) Oral two times a day       GYNECOLOGIC ONCOLOGY PROGRESS NOTE    CHASTITY ROSS is a 53y POD#1 HD#2 s/p  EUA, RA TLH, BS, SLND, cysto      PROBLEMS:  Endometrial hyperplasia    SUBJECTIVE:  Pt seen and examined at bedside.   Patient complains of some pain, was able to take tylenol and ibuprofen overnight. Did not want to take the oxycodone.  Flatus: not yet  Denies Nausea, Vomiting or Diarrhea.  Denies shortness of breath, chest pain or dyspnea on exertion.  Tolerating diet.    OBJECTIVE:   VITALS:  T(F): 98 (04-25-23 @ 04:57), Max: 98.7 (04-24-23 @ 23:00)  HR: 84 (04-25-23 @ 04:57) (69 - 87)  BP: 103/65 (04-25-23 @ 04:57) (91/65 - 143/122)  RR: 18 (04-25-23 @ 04:57) (12 - 18)  SpO2: 95% (04-25-23 @ 04:57) (94% - 100%)  Wt(kg): --    I&O's Summary    24 Apr 2023 07:01  -  25 Apr 2023 06:22  --------------------------------------------------------  IN: 675 mL / OUT: 0 mL / NET: 675 mL    Physical Exam:  Constitutional: NAD  Pulmonary: clear to auscultation bilaterally   Cardiovascular: Regular rate and rhythm   Abdomen: soft, non-tender, non-distended, normal bowel sounds  Extremities: no lower extremity edema or calve tenderness, Claude's sign negative.  Incisions: Clean, dry, intact. Without signs of infection or hernia. Opsites in place    LABS:                        12.7   13.37 )-----------( 249      ( 24 Apr 2023 22:11 )             38.7     04-24    140  |  108  |  9.8  ----------------------------<  146<H>  3.8   |  18.0<L>  |  0.77    Ca    8.7      24 Apr 2023 22:11    TPro  6.2<L>  /  Alb  3.7  /  TBili  0.3<L>  /  DBili  x   /  AST  17  /  ALT  13  /  AlkPhos  74  04-24      acetaminophen     Tablet .. 975 milliGRAM(s) Oral every 6 hours PRN  buPROPion XL (24-Hour) . 300 milliGRAM(s) Oral daily  heparin   Injectable 5000 Unit(s) SubCutaneous every 8 hours  ibuprofen  Tablet. 600 milliGRAM(s) Oral every 6 hours PRN  lactated ringers. 1000 milliLiter(s) IV Continuous <Continuous>  levothyroxine 100 MICROGram(s) Oral daily  ondansetron Injectable 4 milliGRAM(s) IV Push every 6 hours PRN  sodium chloride 0.9%. 1000 milliLiter(s) IV Continuous <Continuous>  topiramate 100 milliGRAM(s) Oral two times a day

## 2023-04-27 LAB — NON-GYNECOLOGICAL CYTOLOGY STUDY: SIGNIFICANT CHANGE UP

## 2023-05-01 ENCOUNTER — TRANSCRIPTION ENCOUNTER (OUTPATIENT)
Age: 53
End: 2023-05-01

## 2023-05-02 ENCOUNTER — TRANSCRIPTION ENCOUNTER (OUTPATIENT)
Age: 53
End: 2023-05-02

## 2023-05-03 LAB — SURGICAL PATHOLOGY STUDY: SIGNIFICANT CHANGE UP

## 2023-05-05 ENCOUNTER — NON-APPOINTMENT (OUTPATIENT)
Age: 53
End: 2023-05-05

## 2023-05-08 ENCOUNTER — TRANSCRIPTION ENCOUNTER (OUTPATIENT)
Age: 53
End: 2023-05-08

## 2023-05-10 ENCOUNTER — APPOINTMENT (OUTPATIENT)
Dept: GYNECOLOGIC ONCOLOGY | Facility: CLINIC | Age: 53
End: 2023-05-10
Payer: COMMERCIAL

## 2023-05-10 VITALS
WEIGHT: 118 LBS | SYSTOLIC BLOOD PRESSURE: 104 MMHG | DIASTOLIC BLOOD PRESSURE: 70 MMHG | HEIGHT: 62 IN | TEMPERATURE: 98.6 F | BODY MASS INDEX: 21.71 KG/M2

## 2023-05-10 PROCEDURE — 99024 POSTOP FOLLOW-UP VISIT: CPT

## 2023-05-11 ENCOUNTER — TRANSCRIPTION ENCOUNTER (OUTPATIENT)
Age: 53
End: 2023-05-11

## 2023-05-16 ENCOUNTER — TRANSCRIPTION ENCOUNTER (OUTPATIENT)
Age: 53
End: 2023-05-16

## 2023-05-26 ENCOUNTER — NON-APPOINTMENT (OUTPATIENT)
Age: 53
End: 2023-05-26

## 2023-05-26 ENCOUNTER — TRANSCRIPTION ENCOUNTER (OUTPATIENT)
Age: 53
End: 2023-05-26

## 2023-05-26 DIAGNOSIS — R39.89 OTHER SYMPTOMS AND SIGNS INVOLVING THE GENITOURINARY SYSTEM: ICD-10-CM

## 2023-05-30 ENCOUNTER — TRANSCRIPTION ENCOUNTER (OUTPATIENT)
Age: 53
End: 2023-05-30

## 2023-05-30 NOTE — DISCUSSION/SUMMARY
[Clean] : was clean [Dry] : was dry [Doing Well] : is doing well [Excellent Pain Control] : has excellent pain control [No Sign of Infection] : is showing no signs of infection [Erythema] : was not erythematous [Ecchymosis] : was not ecchymotic [Seroma] : had no seroma [Firm] : soft [Tender] : nontender [Abnormal Bowel Sounds] : normal bowel sounds [Rebound] : no rebound tenderness [Guarding] : no guarding [Incisional Hernia] : no incisional hernia [Mass] : no palpable mass [External Genitalia Abnormal] : normal external genitalia [Vaginal Exam Abnormal] : normal vaginal exam [de-identified] : Ashlee Hercules MA was present the entire time of gynecological exam.  [de-identified] : Cleaned incisions with alcohol swab.  [FreeTextEntry1] : \par OPERATIVE FINDINGS:\par Uterus sounded to 7 cm, endometrial cavity with small subcentimeter endometrial polyp, and otherwise smooth endometrial cavity.\par \par PATHOLOGY:\par 1  Right low periaortic sentinel lymph node:\par -1 lymph node, negative for metastatic carcinoma (0/1).\par \par 2  Left external iliac sentinel lymph node:\par - 1 lymph node, negative for metastatic carcinoma (0/1).\par 3  Uterus, cervix, bilateral fallopian tubes, hysterectomy:\par -Uterus with inactive/atrophic endometrium and extensive adenomyosis;\par minute residual focus of atypical hyperplasia.\par -Negative cervix, lower uterine segment, left and right\par parametria and fallopian tubes.

## 2023-05-30 NOTE — ASSESSMENT
[FreeTextEntry1] : The patient is healing well without complication. We discussed ongoing recuperation and reviewed final pathology results in detail - a copy was provided to the patient. We reviewed the need for ongoing GYN visits and a recommended plan to follow up with regular GYN. The patient stated and expressed a good understanding of the above information. Reviewed hormone replacement therapy, advised it would not be a problem for her after surgery if she was considering it. Recommended patient to follow up with me in one more month if she has any other issues otherwise she can follow up with her regular gyn.

## 2023-05-30 NOTE — REASON FOR VISIT
[Post Op] : post op visit [de-identified] : 04/24/2023 [de-identified] :  TRH BS, cystoscopy for endometrial hyperplasia. Patient is recently also [place] [facility] [place] [facility] [place], D&C with polypectomy for complex atypical endometrial hyperplasia on 03/21/2023 [de-identified] : The patient is healing remarkably well. She denies a change in appetite, or a change in weight. She is tolerating PO without nausea or vomiting. She denies VB/VD. She denies CP/SOB. Pt reports 6 days after sx, she had a lot of gas. She also was having trouble having with BM, but it has resolved now.

## 2023-05-30 NOTE — END OF VISIT
[FreeTextEntry3] : Written by Ashlee Hercules, acting as a scribe for Dr. Caty Mark.\par This note accurately reflects the work and decisions made by me.

## 2023-05-31 ENCOUNTER — TRANSCRIPTION ENCOUNTER (OUTPATIENT)
Age: 53
End: 2023-05-31

## 2023-06-13 PROBLEM — C54.1 ENDOMETRIAL CANCER: Status: ACTIVE | Noted: 2023-03-02

## 2023-06-14 ENCOUNTER — APPOINTMENT (OUTPATIENT)
Dept: GYNECOLOGIC ONCOLOGY | Facility: CLINIC | Age: 53
End: 2023-06-14
Payer: COMMERCIAL

## 2023-06-14 VITALS
BODY MASS INDEX: 21.35 KG/M2 | DIASTOLIC BLOOD PRESSURE: 90 MMHG | TEMPERATURE: 97.9 F | HEIGHT: 62 IN | WEIGHT: 116 LBS | SYSTOLIC BLOOD PRESSURE: 120 MMHG

## 2023-06-14 DIAGNOSIS — C54.1 MALIGNANT NEOPLASM OF ENDOMETRIUM: ICD-10-CM

## 2023-06-14 PROCEDURE — 99024 POSTOP FOLLOW-UP VISIT: CPT

## 2023-07-05 NOTE — DISCUSSION/SUMMARY
[Clean] : was clean [Dry] : was dry [Intact] : was intact [None] : had no drainage [Normal Skin] : normal appearance [Firm] : soft [Tender] : nontender [Guarding] : no guarding [Doing Well] : is doing well

## 2023-07-05 NOTE — ASSESSMENT
[FreeTextEntry1] : 52 y/o patient s/p TRH BS, cystoscopy for endometrial hyperplasia at [facility] on 4/24/23. Patient is overall feeling well despite some back pain/discomfort. From my standpoint, I recommend she use heat pack at her lower back, alternating Tylenol & Motrin PRN. She is cleared to resume to work & in about 1.5 weeks, should be able to resume all activity. At the 8 week milestone, patient may try to resume physical exercise - recommend support belt or abdominal binder while resuming full physical activity including while at her  job. She believes lack of activity may be contributing to her discomfort. No role for subsequent imaging. Resume routine GYN care.

## 2023-07-05 NOTE — REASON FOR VISIT
[Post Op] : post op visit [de-identified] : 4/24/23 [de-identified] : TRH BS, cystoscopy for endometrial hyperplasia at [facility] [de-identified] : \par Interval post-op exam\par 3 weeks following surgery, patient's father endured two strokes & patient subsequently began feeling unwell (i.e. belly feels heavy,  lower back pain, & fatigue). Went to get blood work done, ordered by my PA which was overall WNL. She then slowed down & stopped over working, & has since been feeling well overall although continues to have achy back pain.

## 2023-07-05 NOTE — END OF VISIT
[FreeTextEntry3] : Written by Gia Espinal, acting as a scribe for Dr. Caty Mark.\par This note accurately reflects the work and decisions made by me.

## 2023-07-17 ENCOUNTER — RX RENEWAL (OUTPATIENT)
Age: 53
End: 2023-07-17

## 2023-09-28 ENCOUNTER — APPOINTMENT (OUTPATIENT)
Dept: ENDOCRINOLOGY | Facility: CLINIC | Age: 53
End: 2023-09-28
Payer: COMMERCIAL

## 2023-09-28 VITALS
WEIGHT: 119 LBS | HEIGHT: 62 IN | BODY MASS INDEX: 21.9 KG/M2 | HEART RATE: 83 BPM | OXYGEN SATURATION: 98 % | DIASTOLIC BLOOD PRESSURE: 75 MMHG | SYSTOLIC BLOOD PRESSURE: 109 MMHG | RESPIRATION RATE: 16 BRPM

## 2023-09-28 DIAGNOSIS — E06.3 AUTOIMMUNE THYROIDITIS: ICD-10-CM

## 2023-09-28 DIAGNOSIS — E03.9 HYPOTHYROIDISM, UNSPECIFIED: ICD-10-CM

## 2023-09-28 PROCEDURE — 99213 OFFICE O/P EST LOW 20 MIN: CPT

## 2023-09-28 RX ORDER — BUPROPION HYDROCHLORIDE 150 MG/1
150 TABLET, FILM COATED, EXTENDED RELEASE ORAL TWICE DAILY
Qty: 180 | Refills: 0 | Status: DISCONTINUED | COMMUNITY
Start: 2023-01-06 | End: 2023-09-28

## 2023-09-28 RX ORDER — BUPROPION HYDROCHLORIDE 150 MG/1
150 TABLET, FILM COATED, EXTENDED RELEASE ORAL
Qty: 180 | Refills: 1 | Status: DISCONTINUED | COMMUNITY
Start: 2022-08-23 | End: 2023-09-28

## 2023-09-28 RX ORDER — ATOGEPANT 60 MG/1
60 TABLET ORAL
Refills: 0 | Status: ACTIVE | COMMUNITY

## 2023-09-29 PROBLEM — E03.9 ACQUIRED HYPOTHYROIDISM: Status: ACTIVE | Noted: 2021-08-26

## 2023-10-01 LAB
ALBUMIN SERPL ELPH-MCNC: 5.1 G/DL
ALP BLD-CCNC: 100 U/L
ALT SERPL-CCNC: 15 U/L
ANION GAP SERPL CALC-SCNC: 15 MMOL/L
AST SERPL-CCNC: 13 U/L
BILIRUB SERPL-MCNC: 0.2 MG/DL
BUN SERPL-MCNC: 11 MG/DL
CALCIUM SERPL-MCNC: 10.2 MG/DL
CHLORIDE SERPL-SCNC: 106 MMOL/L
CHOLEST SERPL-MCNC: 253 MG/DL
CO2 SERPL-SCNC: 20 MMOL/L
CREAT SERPL-MCNC: 0.98 MG/DL
EGFR: 69 ML/MIN/1.73M2
GLUCOSE SERPL-MCNC: 92 MG/DL
HCT VFR BLD CALC: 44.6 %
HDLC SERPL-MCNC: 72 MG/DL
HGB BLD-MCNC: 14.7 G/DL
LDLC SERPL CALC-MCNC: 169 MG/DL
MCHC RBC-ENTMCNC: 30.8 PG
MCHC RBC-ENTMCNC: 33 GM/DL
MCV RBC AUTO: 93.5 FL
NONHDLC SERPL-MCNC: 182 MG/DL
PLATELET # BLD AUTO: 374 K/UL
POTASSIUM SERPL-SCNC: 4.4 MMOL/L
PROT SERPL-MCNC: 7.7 G/DL
RBC # BLD: 4.77 M/UL
RBC # FLD: 13.7 %
SODIUM SERPL-SCNC: 141 MMOL/L
T3FREE SERPL-MCNC: 2.82 PG/ML
T4 FREE SERPL-MCNC: 2 NG/DL
TRIGL SERPL-MCNC: 76 MG/DL
TSH SERPL-ACNC: 0.1 UIU/ML
WBC # FLD AUTO: 5.79 K/UL

## 2023-10-18 ENCOUNTER — TRANSCRIPTION ENCOUNTER (OUTPATIENT)
Age: 53
End: 2023-10-18

## 2023-10-30 ENCOUNTER — TRANSCRIPTION ENCOUNTER (OUTPATIENT)
Age: 53
End: 2023-10-30

## 2023-11-01 ENCOUNTER — TRANSCRIPTION ENCOUNTER (OUTPATIENT)
Age: 53
End: 2023-11-01

## 2023-11-17 ENCOUNTER — TRANSCRIPTION ENCOUNTER (OUTPATIENT)
Age: 53
End: 2023-11-17

## 2023-12-19 ENCOUNTER — APPOINTMENT (OUTPATIENT)
Dept: OBGYN | Facility: CLINIC | Age: 53
End: 2023-12-19
Payer: COMMERCIAL

## 2023-12-19 VITALS
SYSTOLIC BLOOD PRESSURE: 102 MMHG | HEIGHT: 62 IN | DIASTOLIC BLOOD PRESSURE: 64 MMHG | BODY MASS INDEX: 21.53 KG/M2 | WEIGHT: 117 LBS

## 2023-12-19 DIAGNOSIS — Z90.710 ACQUIRED ABSENCE OF BOTH CERVIX AND UTERUS: ICD-10-CM

## 2023-12-19 DIAGNOSIS — Z85.42 PERSONAL HISTORY OF MALIGNANT NEOPLASM OF OTHER PARTS OF UTERUS: ICD-10-CM

## 2023-12-19 DIAGNOSIS — Z90.79 ACQUIRED ABSENCE OF BOTH CERVIX AND UTERUS: ICD-10-CM

## 2023-12-19 DIAGNOSIS — Z78.0 ASYMPTOMATIC MENOPAUSAL STATE: ICD-10-CM

## 2023-12-19 DIAGNOSIS — Z90.722 ACQUIRED ABSENCE OF BOTH CERVIX AND UTERUS: ICD-10-CM

## 2023-12-19 PROCEDURE — 99214 OFFICE O/P EST MOD 30 MIN: CPT | Mod: 25

## 2023-12-19 PROCEDURE — 99204 OFFICE O/P NEW MOD 45 MIN: CPT | Mod: 25

## 2023-12-19 PROCEDURE — 76830 TRANSVAGINAL US NON-OB: CPT

## 2023-12-19 NOTE — DISCUSSION/SUMMARY
[FreeTextEntry1] : Impression: History of endometrial cancer, history of hysterectomy, hypothyroidism, menopause  Discussed with patient that despite findings on CT scan or pelvic sonogram today does not mean that the ovaries are now present the ovaries during menopause can be very small and obscured by bowel contents.  However patient advised to have official radiology pelvic sonogram for further evaluation and hormonal lab evaluation as well.  Instructions and precautions reviewed and questions answered.  Follow-up as needed and for routine GYN care

## 2023-12-19 NOTE — HISTORY OF PRESENT ILLNESS
[FreeTextEntry1] : Patient is a 53-year-old female who presents for a consultation.  Patient with a history of a robotic total hysterectomy and bilateral salpingectomy in April 2023 for endometrial cancer.  Patient had a subsequent CAT scan which stated that the ovaries were not present.  Patient became very concerned that she was told that her ovaries were left in at the time of surgery by GYN oncology and presents today for a sonogram to document the presence of her ovaries.  I have reviewed the patient's chart the operative report by the GYN oncologist states that the ovaries appeared normal and were conserved at the time of surgery.  The patient had a total robotically assisted hysterectomy bilateral salpingectomy and pelvic lymph node sampling.  The pathology report from the surgery did not show the presence of ovaries and the pathologic specimen.  Patient reassured based on this information that her ovaries were left in at the time of surgery.  However patient still concerned we will do a pelvic ultrasound today and refer patient to radiology for an official pelvic sonogram evaluation and also advised hormonal lab evaluation as well to rule out menopause.  Patient states she has been treated with testosterone by another GYN physician for low energy.

## 2023-12-19 NOTE — PROCEDURE
[Transvaginal Ultrasound] : transvaginal ultrasound [FreeTextEntry3] : Transvaginal pelvic ultrasound performed: A normal-appearing bladder is seen there is a great deal of bowel activity with bowel gas and fluid.  No obvious masses were seen ovaries were not seen.

## 2023-12-20 ENCOUNTER — APPOINTMENT (OUTPATIENT)
Dept: ULTRASOUND IMAGING | Facility: CLINIC | Age: 53
End: 2023-12-20
Payer: COMMERCIAL

## 2023-12-20 ENCOUNTER — OUTPATIENT (OUTPATIENT)
Dept: OUTPATIENT SERVICES | Facility: HOSPITAL | Age: 53
LOS: 1 days | End: 2023-12-20
Payer: COMMERCIAL

## 2023-12-20 ENCOUNTER — RESULT REVIEW (OUTPATIENT)
Age: 53
End: 2023-12-20

## 2023-12-20 DIAGNOSIS — Z85.42 PERSONAL HISTORY OF MALIGNANT NEOPLASM OF OTHER PARTS OF UTERUS: ICD-10-CM

## 2023-12-20 DIAGNOSIS — Z98.890 OTHER SPECIFIED POSTPROCEDURAL STATES: Chronic | ICD-10-CM

## 2023-12-20 DIAGNOSIS — Z98.89 OTHER SPECIFIED POSTPROCEDURAL STATES: Chronic | ICD-10-CM

## 2023-12-20 DIAGNOSIS — Z90.710 ACQUIRED ABSENCE OF BOTH CERVIX AND UTERUS: ICD-10-CM

## 2023-12-20 PROCEDURE — 76830 TRANSVAGINAL US NON-OB: CPT | Mod: 26

## 2023-12-20 PROCEDURE — 76856 US EXAM PELVIC COMPLETE: CPT | Mod: 26

## 2023-12-20 PROCEDURE — 76856 US EXAM PELVIC COMPLETE: CPT

## 2023-12-20 PROCEDURE — 76830 TRANSVAGINAL US NON-OB: CPT

## 2023-12-21 ENCOUNTER — TRANSCRIPTION ENCOUNTER (OUTPATIENT)
Age: 53
End: 2023-12-21

## 2023-12-21 LAB
ESTRADIOL SERPL-MCNC: 7 PG/ML
FSH SERPL-MCNC: 99.1 IU/L
PROGEST SERPL-MCNC: 0.2 NG/ML

## 2023-12-22 PROBLEM — Z85.42 HISTORY OF ENDOMETRIAL CANCER: Status: RESOLVED | Noted: 2023-12-19 | Resolved: 2023-12-22

## 2023-12-22 PROBLEM — Z85.42 HISTORY OF ENDOMETRIAL CANCER: Status: ACTIVE | Noted: 2023-12-22

## 2023-12-28 ENCOUNTER — APPOINTMENT (OUTPATIENT)
Dept: CT IMAGING | Facility: CLINIC | Age: 53
End: 2023-12-28
Payer: COMMERCIAL

## 2023-12-28 ENCOUNTER — OUTPATIENT (OUTPATIENT)
Dept: OUTPATIENT SERVICES | Facility: HOSPITAL | Age: 53
LOS: 1 days | End: 2023-12-28
Payer: COMMERCIAL

## 2023-12-28 DIAGNOSIS — Z85.42 PERSONAL HISTORY OF MALIGNANT NEOPLASM OF OTHER PARTS OF UTERUS: ICD-10-CM

## 2023-12-28 DIAGNOSIS — Z98.890 OTHER SPECIFIED POSTPROCEDURAL STATES: Chronic | ICD-10-CM

## 2023-12-28 DIAGNOSIS — Z41.9 ENCOUNTER FOR PROCEDURE FOR PURPOSES OTHER THAN REMEDYING HEALTH STATE, UNSPECIFIED: Chronic | ICD-10-CM

## 2023-12-28 DIAGNOSIS — Z98.89 OTHER SPECIFIED POSTPROCEDURAL STATES: Chronic | ICD-10-CM

## 2023-12-28 PROCEDURE — 72193 CT PELVIS W/DYE: CPT | Mod: 26

## 2023-12-28 PROCEDURE — 72193 CT PELVIS W/DYE: CPT

## 2024-01-05 ENCOUNTER — TRANSCRIPTION ENCOUNTER (OUTPATIENT)
Age: 54
End: 2024-01-05

## 2024-01-12 ENCOUNTER — TRANSCRIPTION ENCOUNTER (OUTPATIENT)
Age: 54
End: 2024-01-12

## 2024-01-30 ENCOUNTER — APPOINTMENT (OUTPATIENT)
Dept: ENDOCRINOLOGY | Facility: CLINIC | Age: 54
End: 2024-01-30
Payer: COMMERCIAL

## 2024-01-30 VITALS
BODY MASS INDEX: 21.53 KG/M2 | SYSTOLIC BLOOD PRESSURE: 120 MMHG | OXYGEN SATURATION: 99 % | HEIGHT: 62 IN | DIASTOLIC BLOOD PRESSURE: 81 MMHG | HEART RATE: 75 BPM | WEIGHT: 117 LBS

## 2024-01-30 DIAGNOSIS — E03.9 HYPOTHYROIDISM, UNSPECIFIED: ICD-10-CM

## 2024-01-30 PROCEDURE — 99214 OFFICE O/P EST MOD 30 MIN: CPT

## 2024-01-31 NOTE — HISTORY OF PRESENT ILLNESS
[FreeTextEntry1] : Ms. MAKAYLA ROSS is a 53 year old female  with a past medical history of hypothyroidism who presents for management of her thyroid disease.  She has been taking Levothyroxine 100 mcg 8 tabs per week. Her TSH is now 0.046.  Initial Hx: Patient was first diagnosed with thyroid disorder 24 years ago. It started after her first pregnancy. Patient was on Levothyroxine 88 mcg QD for 17 years. Since May of last year, she started having edema.  She  takes levothyroxine/methimazole   mg  QD. Patient takes it in an empty stomach 30-60 mins before breakfast in the morning.  Patient was started on Bioidentical hormones on November 2020. Patient kept gaining weight and she stopped the bioidentical hormones in 2/2021. She went to another endo. She was switched to Unithroid 100 mcg QD (May/2021). She did not feel better. She was switched to Unithroid 75 mcg + Cytomel 5 mcg. She was increased to Unithroid 100 mcg. She was also started on Bijuva. Patient had a breakthrough period in March. Before October, she had almost every month. Her period had stopped at one point on July 2019 when she had a major loss in her life.

## 2024-01-31 NOTE — ASSESSMENT
[FreeTextEntry1] : 53 year old female  with a past medical history of hypothyroidism who presents for management of her thyroid disease   1. Hypothyroidism TSH is too low now Patient feels well and does not want to decrease dose because she is worried about her symptoms I spent a lot of time counseling on patient and possible risk of being in the hyperthyroid range She feels better at the higher range but currently it is too high She recently stopped testosterone and is requesting that we repeat her levels Agreed to repeat her levels but if too high then she needs to reduce to 7-1/2 tabs per week Cont Levothyroxine to 100 mcg 8 tabs per week We will repeat labs in a week  Follow up in 4 months

## 2024-02-24 NOTE — DISCHARGE NOTE PROVIDER - NPI NUMBER (FOR SYSADMIN USE ONLY) :
Patient from home for c/o paranoia because of \"music\". Patient has a hx of bipolar and schizoaffective disorder on trazodone and klonopin and states he missed last nights dose because of \"music\". Patient denies SI/HI.  
[[id number]]

## 2024-03-05 NOTE — H&P PST ADULT - NSANTHNECKRD_ENT_A_CORE
[Spouse] : spouse [FreeTextEntry1] : F/u exam [de-identified] : Mr. SHERWIN MINOR 78 year male with a PMH HTN, uncontrolled DM2, CRI, has problem with memory, h/o renal cell carcinoma, glaucoma, h/o TIA present to the office to a f/u. Wants to do a blood test. Patient feels OK today, denies complaints. Has an appointment with ophthalmologist this afternoon(c/o blurry vision) From last visit was seen by nephrologist, endocrinologist, urologist Seen by a neurologist for  a memory, start patient on donepezil 5 mg qd No

## 2024-04-03 ENCOUNTER — TRANSCRIPTION ENCOUNTER (OUTPATIENT)
Age: 54
End: 2024-04-03

## 2024-04-28 NOTE — H&P PST ADULT - NEGATIVE OPHTHALMOLOGIC SYMPTOMS
28-Apr-2024 13:11
no diplopia/no photophobia/no lacrimation L/no lacrimation R/no blurred vision L/no blurred vision R

## 2024-06-17 RX ORDER — LEVOTHYROXINE SODIUM 0.11 MG/1
112 TABLET ORAL DAILY
Qty: 90 | Refills: 0 | Status: ACTIVE | COMMUNITY
Start: 2021-09-24 | End: 1900-01-01

## 2024-08-21 ENCOUNTER — TRANSCRIPTION ENCOUNTER (OUTPATIENT)
Age: 54
End: 2024-08-21

## 2024-08-22 ENCOUNTER — NON-APPOINTMENT (OUTPATIENT)
Age: 54
End: 2024-08-22

## 2024-08-26 NOTE — ASSESSMENT
Contacted pt & relayed PCP's message. Pt verbalized understanding and did not have any additional questions at this time.      HPI:   Annabella Live is a 43 year old female who presents for a complete physical exam.     Has changed her diet, she is walking more, and is managing her weight, but she is still constipated. She had a colonoscopy in March of 2023 and was treated for H. Pylori at that time. Felt she was doing well for awhile, but then the constipation returned.   Also feeling more heartburn symptoms recently, and not sure why this is. Tries to avoid known triggers as much as possible.  Migraines have been better since adding Sumatriptan, but she will sometimes run out before the end of the month.   Asthma has also been well controlled.     Last pap: 7/2021 and normal   Last mammogram: 12/2023 and normal-alternating mammograms with breast MRI   Menses: Regular, monthly cycles   Contraception:   had a vasectomy   Previous colonoscopy: 3/2023 with one large polyp-told to repeat in 5 years   History of STD's: None  History of intimate partner violence: None  Family hx of breast, ovarian, cervical or colon CA: Multiple relatives with breast cancer   Diet and exercise: Breakfast is usually half a [place] bread with a scrambled egg and cheese. Will have water with it. Lunch is chicken salad. Dinner is home cooked typically, and will have a lot of salmon and vegetables. Had more sweets over the last month due to stress. Tries to have oatmeal with walnuts during the day, and will snack on nuts. Drinks only water, and drinks a lot of it. Walks regularly for exercise.   Immunizations:  Tdap: 2021, Covid: Completed     REVIEW OF SYSTEMS:   GENERAL: feels well otherwise   SKIN: denies any unusual skin lesions  EYES: no vision problems  BREAST: no lumps or masses, no nipple discharge, intermittent right breast pain  LUNGS: denies shortness of breath  CARDIOVASCULAR: denies chest pain  GI: intermittent heartburn, Constipation but no diarrhea, no hematochezia  : denies dysuria, vaginal discharge or itching  NEURO: improved migraines  [FreeTextEntry1] : 51 year old female  with a past medical history of hypothyroidism who presents for management of her thyroid disease\par \par 1. Weight gain\par Suspect patient is experiencing all her symptoms from menopause\par Her labs are consistent with menopause\par Inc Phentermine to 30 mg QD\par Metamucil to help with constipation\par \par 2. Hypothyroidism\par Her TSH is too low\par Dec Levothyroxine to 100 mcg 6.5 tabs per week\par \par Follow up in 3 months   PSYCHE: denies depression or anxiety          Current Outpatient Medications   Medication Sig Dispense Refill    SUMAtriptan 50 MG Oral Tab Take 1 tablet (50 mg total) by mouth every 2 (two) hours as needed for Migraine (max 2 doses per day). 9 tablet 2    BUPROPION  MG Oral Tablet 24 Hr TAKE 1 TABLET(150 MG) BY MOUTH DAILY 90 tablet 0    ERGOCALCIFEROL 1.25 MG (96638 UT) Oral Cap TAKE 1 CAPSULE BY MOUTH 1 TIME A WEEK FOR 12 DOSES 12 capsule 0    potassium citrate 10 MEQ (1080 MG) Oral Tab CR TAKE 2 TABLETS BY MOUTH IN THE MORNING AND 2 TABLETS BEFORE BEDTIME 240 tablet 2    Phentermine HCl 37.5 MG Oral Tab Take 1 tablet (37.5 mg total) by mouth every morning before breakfast. 30 tablet 3    BIOTIN OR Take 1 capsule by mouth daily.      clobetasol 0.05 % External Solution Apply 1 mL topically daily as needed. 60 mL 3    acidophilus-pectin Oral Cap Take 1 capsule by mouth daily.      Budesonide (PULMICORT FLEXHALER) 90 MCG/ACT Inhalation Aerosol Powder, Breath Activated Inhale 1 puff into the lungs 2 (two) times daily as needed. 2 each 2    montelukast 10 MG Oral Tab Take 1 tablet (10 mg total) by mouth nightly. 90 tablet 1    albuterol 108 (90 Base) MCG/ACT Inhalation Aero Soln Inhale 2 puffs into the lungs every 6 (six) hours as needed for Wheezing or Shortness of Breath. 1 each 0    prenatal vitamin w/DHA 27-0.8-228 MG Oral Cap Take 1 capsule by mouth daily.      Blood Glucose Monitoring Suppl (ONETOUCH VERIO FLEX SYSTEM) w/Device Does not apply Kit 1 kit by Does not apply route 2 (two) times daily. May substitute per insurance formulary 1 kit 0     No Known Allergies   Past Medical History:    Acne    Allergic rhinitis    Anesthesia complication    Anxiety    Asthma (HCC)    Calculus of kidney    Environmental allergies    Esophageal reflux    Starting having heartburn    Fatty liver    Gestational diabetes (HCC)    High cholesterol    History of anemia    History of migraine    Hyperlipidemia    Kidney  stone    Migraines    Obesity    PONV (postoperative nausea and vomiting)    Recurrent kidney stones    Visual impairment      Past Surgical History:   Procedure Laterality Date    Colonoscopy N/A 2023    Procedure: COLONOSCOPY;  Surgeon: Fausto Cosme MD;  Location: [facility] ENDO    Cystoscopy,insert ureteral stent  2023      22    Other surgical history  2024    hysteroscopic currettage    [place] teeth removed      at age 17       Family History   Problem Relation Age of Onset    Bipolar Disorder Mother     Diabetes Father     Glaucoma Father     Breast Cancer Maternal Grandmother 45    Other (bone cancer) Maternal Grandmother     Cancer Maternal Grandmother     No Known Problems Maternal Grandfather     Diabetes Paternal Grandmother     Heart Disease Paternal Grandfather     Blindness Paternal Grandfather     Heart Disease Maternal Aunt 40    Heart Disease Maternal Aunt 40    Breast Cancer Paternal Cousin 38        twin sisters (unsure of BRCA status)    Breast Cancer Paternal Cousin 38        twin sisters (unsure of BRCA status)    No Known Problems Brother          of meningitis    Breast Cancer Maternal Aunt     Dementia Maternal Aunt     Other (Other) Maternal Aunt         eye cancer     Heart Disease Maternal Aunt     Diabetes Paternal Aunt     Ovarian Cancer Neg     Colon Cancer Neg       Social History:   Social History     Socioeconomic History    Marital status:    Occupational History    Occupation: Branch   Tobacco Use    Smoking status: Never     Passive exposure: Never    Smokeless tobacco: Never   Vaping Use    Vaping status: Never Used   Substance and Sexual Activity    Alcohol use: Not Currently    Drug use: Never    Sexual activity: Yes     Partners: Male     Birth control/protection: Vasectomy   Other Topics Concern    Blood Transfusions No    Caffeine Concern No    Stress Concern No    Weight Concern Yes    Special Diet No    Exercise No    Seat Belt No    Grew  up on a farm No    History of tanning No    Outdoor occupation Yes    Breast feeding No    Reaction to local anesthetic No    Pt has a pacemaker No    Pt has a defibrillator No   Social History Narrative    Live with  (Bryan)    No h/o abuse     Occ: . : Yes. Children: 1 son, 2.5 years old.         EXAM:     Wt Readings from Last 6 Encounters:   08/26/24 165 lb (74.8 kg)   03/29/24 175 lb (79.4 kg)   03/13/24 160 lb (72.6 kg)   01/23/24 162 lb 12.8 oz (73.8 kg)   01/09/24 159 lb (72.1 kg)   12/18/23 155 lb 12.8 oz (70.7 kg)     Body mass index is 31.18 kg/m².   /70   Pulse 95   Ht 5' 1\" (1.549 m)   Wt 165 lb (74.8 kg)   LMP 03/05/2024 (Approximate)   SpO2 99%   BMI 31.18 kg/m²     GENERAL: well developed, well nourished, in no apparent distress   SKIN: no rashes, no suspicious lesions  HEENT: atraumatic, normocephalic, throat clear; normal dentition  EYES: PERRLA, EOMI, conjunctiva are clear  NECK: supple, no adenopathy or thyroid masses   BREAST: no dominant or suspicious mass, no nipple discharge  LUNGS: clear to auscultation  CARDIO: RRR without murmur  GI: good bowel sounds, no masses, HSM or tenderness  : deferred, plans to see gynecologist    EXTREMITIES: no edema    Cholesterol, Total (mg/dL)   Date Value   08/14/2023 254 (H)   08/30/2022 227 (H)     HDL Cholesterol (mg/dL)   Date Value   08/14/2023 63 (H)   08/30/2022 81 (H)     LDL Cholesterol (mg/dL)   Date Value   08/14/2023 143 (H)   08/30/2022 132 (H)      ASSESSMENT AND PLAN:   Annabella Live is a 43 year old female who presents for a complete physical exam.  Encounter Diagnoses   Name Primary?    Encounter for routine adult health examination without abnormal findings Yes    Mild intermittent asthma without complication (HCC)     Constipation, unspecified constipation type     Heartburn     Vitamin D deficiency     Iron deficiency     Migraine without aura and without status migrainosus, not intractable       Orders Placed This Encounter   Procedures    CBC With Differential With Platelet    Comp Metabolic Panel (14)    Hemoglobin A1C    Lipid Panel    H. Pylori Stool Ag, EIA [E]    Vitamin D [E]    Iron And Tibc [E]    Ferritin [E]     Mild intermittent asthma well-controlled, and asthma action plan updated today.  Continue high-fiber diet, fiber supplementation, and increased water intake for constipation.  Will also follow-up with gastroenterologist to discuss further.  Check H. pylori stool testing due to history of H. pylori and recent heartburn.  Also to reduce known triggers.  Check vitamin D and iron studies due to history of vitamin D deficiency and iron deficiency.  Continue supplementation.  Will increase sumatriptan dose to 50 mg daily as needed for migraines, and can cut it in half if she does not need a full tablet.  Also to avoid triggers and notify me if not improving or worsening.    Discussed with patient the following:  -Monthly breast self-exam  -Breast cancer screening/mammograms and clinical breast exams  -Cervical cancer screening/pap smears  -Colon cancer screening/colonoscopy  -Adequate calcium and Vitamin D intake to prevent osteoporosis  -Healthy diet including adequate intake of vegetables and fruits, appropriate portion sizes, minimizing highly concentrated carbohydrate foods  -Exercising 30 minutes a day most days of the week   -Diabetes screening which she desires  -Cholesterol screening which she desires  -Recommendation for yearly influenza vaccine  -Need for Tdap once as an adult and Td booster every 10 years  -Need for Zoster vaccine for patients >= 50  -Contraception:  had a vasectomy   -STI screening (GC/Chlamydia/HIV): Not indicated  -Hepatitis C screening for all adults between the ages of 18 and 79: Check in the future     All questions were answered during the visit and the patient verbalizes understanding. She will return in one year for next WWE or sooner as  needed.    Meds & Refills for this Visit:  Requested Prescriptions     Signed Prescriptions Disp Refills    SUMAtriptan 50 MG Oral Tab 9 tablet 2     Sig: Take 1 tablet (50 mg total) by mouth every 2 (two) hours as needed for Migraine (max 2 doses per day).       Imaging & Consults:  GASTRO - INTERNAL    Rita Boggs DO  8/26/2024  11:06 AM

## 2024-09-27 ENCOUNTER — TRANSCRIPTION ENCOUNTER (OUTPATIENT)
Age: 54
End: 2024-09-27

## 2024-10-01 DIAGNOSIS — Z78.0 ASYMPTOMATIC MENOPAUSAL STATE: ICD-10-CM

## 2024-10-01 DIAGNOSIS — E03.9 HYPOTHYROIDISM, UNSPECIFIED: ICD-10-CM

## 2024-10-01 DIAGNOSIS — E06.3 AUTOIMMUNE THYROIDITIS: ICD-10-CM

## 2024-10-10 ENCOUNTER — APPOINTMENT (OUTPATIENT)
Dept: ENDOCRINOLOGY | Facility: CLINIC | Age: 54
End: 2024-10-10

## 2024-10-16 ENCOUNTER — APPOINTMENT (OUTPATIENT)
Dept: ENDOCRINOLOGY | Facility: CLINIC | Age: 54
End: 2024-10-16
Payer: COMMERCIAL

## 2024-10-16 DIAGNOSIS — Z78.0 ASYMPTOMATIC MENOPAUSAL STATE: ICD-10-CM

## 2024-10-16 DIAGNOSIS — E03.9 HYPOTHYROIDISM, UNSPECIFIED: ICD-10-CM

## 2024-10-16 PROCEDURE — 99214 OFFICE O/P EST MOD 30 MIN: CPT | Mod: 95

## 2024-11-12 ENCOUNTER — RX RENEWAL (OUTPATIENT)
Age: 54
End: 2024-11-12

## 2024-11-22 ENCOUNTER — TRANSCRIPTION ENCOUNTER (OUTPATIENT)
Age: 54
End: 2024-11-22

## 2024-12-03 ENCOUNTER — TRANSCRIPTION ENCOUNTER (OUTPATIENT)
Age: 54
End: 2024-12-03

## 2024-12-04 ENCOUNTER — TRANSCRIPTION ENCOUNTER (OUTPATIENT)
Age: 54
End: 2024-12-04

## 2024-12-10 ENCOUNTER — TRANSCRIPTION ENCOUNTER (OUTPATIENT)
Age: 54
End: 2024-12-10

## 2024-12-10 DIAGNOSIS — E06.3 AUTOIMMUNE THYROIDITIS: ICD-10-CM

## 2024-12-10 RX ORDER — LIOTHYRONINE SODIUM 5 UG/1
5 TABLET ORAL
Qty: 30 | Refills: 4 | Status: ACTIVE | COMMUNITY
Start: 2024-12-10 | End: 1900-01-01

## 2024-12-12 ENCOUNTER — TRANSCRIPTION ENCOUNTER (OUTPATIENT)
Age: 54
End: 2024-12-12

## 2024-12-13 ENCOUNTER — TRANSCRIPTION ENCOUNTER (OUTPATIENT)
Age: 54
End: 2024-12-13

## 2024-12-27 ENCOUNTER — TRANSCRIPTION ENCOUNTER (OUTPATIENT)
Age: 54
End: 2024-12-27

## 2024-12-28 ENCOUNTER — NON-APPOINTMENT (OUTPATIENT)
Age: 54
End: 2024-12-28

## 2024-12-30 ENCOUNTER — TRANSCRIPTION ENCOUNTER (OUTPATIENT)
Age: 54
End: 2024-12-30

## 2025-01-29 ENCOUNTER — APPOINTMENT (OUTPATIENT)
Dept: ENDOCRINOLOGY | Facility: CLINIC | Age: 55
End: 2025-01-29
Payer: COMMERCIAL

## 2025-01-29 DIAGNOSIS — E03.9 HYPOTHYROIDISM, UNSPECIFIED: ICD-10-CM

## 2025-01-29 PROCEDURE — 99213 OFFICE O/P EST LOW 20 MIN: CPT | Mod: 95

## 2025-02-07 ENCOUNTER — TRANSCRIPTION ENCOUNTER (OUTPATIENT)
Age: 55
End: 2025-02-07

## 2025-02-19 ENCOUNTER — APPOINTMENT (OUTPATIENT)
Dept: ULTRASOUND IMAGING | Facility: CLINIC | Age: 55
End: 2025-02-19
Payer: COMMERCIAL

## 2025-02-19 ENCOUNTER — APPOINTMENT (OUTPATIENT)
Dept: MAMMOGRAPHY | Facility: CLINIC | Age: 55
End: 2025-02-19
Payer: COMMERCIAL

## 2025-02-19 PROCEDURE — 77067 SCR MAMMO BI INCL CAD: CPT

## 2025-02-19 PROCEDURE — 76641 ULTRASOUND BREAST COMPLETE: CPT | Mod: 50

## 2025-02-19 PROCEDURE — 77063 BREAST TOMOSYNTHESIS BI: CPT

## 2025-08-08 DIAGNOSIS — E03.9 HYPOTHYROIDISM, UNSPECIFIED: ICD-10-CM

## 2025-08-12 ENCOUNTER — TRANSCRIPTION ENCOUNTER (OUTPATIENT)
Age: 55
End: 2025-08-12

## 2025-08-13 ENCOUNTER — TRANSCRIPTION ENCOUNTER (OUTPATIENT)
Age: 55
End: 2025-08-13

## 2025-09-01 ENCOUNTER — RX RENEWAL (OUTPATIENT)
Age: 55
End: 2025-09-01

## (undated) DEVICE — UTERINE MANIPULATOR CONMED VCARE MED 34MM

## (undated) DEVICE — PACK ROBOTIC

## (undated) DEVICE — LIGASURE BLUNT TIP 37CM

## (undated) DEVICE — PREP DYNA-HEX CHG 4% 4OZ BOTTLE (BACTOSHIELD)

## (undated) DEVICE — SUT VLOC 180 0 9" GS-21 GREEN

## (undated) DEVICE — PREP CHLORAPREP HI-LITE ORANGE 26ML

## (undated) DEVICE — XI VESSEL SEALER

## (undated) DEVICE — POSITIONER FOAM EGG CRATE ULNAR 2PCS (PINK)

## (undated) DEVICE — PREP TRAY DRY SKIN PREP SCRUB

## (undated) DEVICE — DRSG OPSITE 2.5 X 2"

## (undated) DEVICE — NDL COUNTER FOAM AND ADHESIVE 40-70

## (undated) DEVICE — SUT MONOCRYL 4-0 27" PS-2 UNDYED

## (undated) DEVICE — VENODYNE/SCD SLEEVE CALF MEDIUM

## (undated) DEVICE — NDL SPINAL 22G X 3.5" (BLACK)

## (undated) DEVICE — SOL IRR POUR H2O 1000ML

## (undated) DEVICE — SUT VICRYL 2-0 27" CT-2 UNDYED

## (undated) DEVICE — GLV 7.5 PROTEXIS (WHITE)

## (undated) DEVICE — SOL INJ NS 0.9% 100ML

## (undated) DEVICE — DRSG KERLIX ROLL 4.5"

## (undated) DEVICE — XI SEAL UNIV 5- 8 MM

## (undated) DEVICE — XI DRAPE ARM

## (undated) DEVICE — DRAPE ROBOTIC

## (undated) DEVICE — SYR CONTROL LUER LOK 10CC

## (undated) DEVICE — ELCTR GROUNDING PAD ADULT COVIDIEN

## (undated) DEVICE — ELCTR CORD FOOTSWITCH 1PLR LAPSCP 10FT

## (undated) DEVICE — BLADE SURGICAL #11 CARBON

## (undated) DEVICE — XI DRAPE COLUMN

## (undated) DEVICE — LIGASURE ATLAS 10MM 20CM

## (undated) DEVICE — SOL IRR POUR NS 0.9% 1000ML

## (undated) DEVICE — XI TIP COVER

## (undated) DEVICE — TUBING AIRSEAL TRI-LUMEN FILTERED

## (undated) DEVICE — SUT VICRYL 0 27" UR-6

## (undated) DEVICE — POSITIONER PINK PAD PIGAZZI SYSTEM

## (undated) DEVICE — WARMING BLANKET UPPER ADULT

## (undated) DEVICE — TROCAR SURGIQUEST AIRSEAL 8MMX100MM

## (undated) DEVICE — XI OBTURATOR OPTICAL BLADELESS 8MM